# Patient Record
Sex: MALE | Race: WHITE | Employment: UNEMPLOYED | URBAN - METROPOLITAN AREA
[De-identification: names, ages, dates, MRNs, and addresses within clinical notes are randomized per-mention and may not be internally consistent; named-entity substitution may affect disease eponyms.]

---

## 2019-05-23 ENCOUNTER — APPOINTMENT (OUTPATIENT)
Dept: GENERAL RADIOLOGY | Age: 29
End: 2019-05-23
Payer: MEDICARE

## 2019-05-23 ENCOUNTER — HOSPITAL ENCOUNTER (EMERGENCY)
Age: 29
Discharge: OTHER INSTITUTION | End: 2019-05-23
Attending: EMERGENCY MEDICINE
Payer: MEDICARE

## 2019-05-23 ENCOUNTER — APPOINTMENT (OUTPATIENT)
Dept: CT IMAGING | Age: 29
End: 2019-05-23
Payer: MEDICARE

## 2019-05-23 VITALS
TEMPERATURE: 100.6 F | WEIGHT: 147.71 LBS | RESPIRATION RATE: 18 BRPM | OXYGEN SATURATION: 97 % | DIASTOLIC BLOOD PRESSURE: 79 MMHG | SYSTOLIC BLOOD PRESSURE: 136 MMHG | HEART RATE: 106 BPM | BODY MASS INDEX: 20.03 KG/M2

## 2019-05-23 DIAGNOSIS — L03.211 FACIAL CELLULITIS: Primary | ICD-10-CM

## 2019-05-23 LAB
A/G RATIO: 1.2 (ref 1.1–2.2)
ALBUMIN SERPL-MCNC: 4.4 G/DL (ref 3.4–5)
ALP BLD-CCNC: 68 U/L (ref 40–129)
ALT SERPL-CCNC: 62 U/L (ref 10–40)
AMPHETAMINE SCREEN, URINE: POSITIVE
ANION GAP SERPL CALCULATED.3IONS-SCNC: 12 MMOL/L (ref 3–16)
AST SERPL-CCNC: 42 U/L (ref 15–37)
BARBITURATE SCREEN URINE: ABNORMAL
BASOPHILS ABSOLUTE: 0 K/UL (ref 0–0.2)
BASOPHILS RELATIVE PERCENT: 0.2 %
BENZODIAZEPINE SCREEN, URINE: ABNORMAL
BILIRUB SERPL-MCNC: 0.7 MG/DL (ref 0–1)
BUN BLDV-MCNC: 17 MG/DL (ref 7–20)
CALCIUM SERPL-MCNC: 9.9 MG/DL (ref 8.3–10.6)
CANNABINOID SCREEN URINE: ABNORMAL
CHLORIDE BLD-SCNC: 94 MMOL/L (ref 99–110)
CO2: 26 MMOL/L (ref 21–32)
COCAINE METABOLITE SCREEN URINE: ABNORMAL
CREAT SERPL-MCNC: 0.7 MG/DL (ref 0.9–1.3)
EKG ATRIAL RATE: 109 BPM
EKG DIAGNOSIS: NORMAL
EKG P AXIS: 69 DEGREES
EKG P-R INTERVAL: 128 MS
EKG Q-T INTERVAL: 344 MS
EKG QRS DURATION: 94 MS
EKG QTC CALCULATION (BAZETT): 463 MS
EKG R AXIS: 60 DEGREES
EKG T AXIS: 49 DEGREES
EKG VENTRICULAR RATE: 109 BPM
EOSINOPHILS ABSOLUTE: 0 K/UL (ref 0–0.6)
EOSINOPHILS RELATIVE PERCENT: 0 %
GFR AFRICAN AMERICAN: >60
GFR NON-AFRICAN AMERICAN: >60
GLOBULIN: 3.6 G/DL
GLUCOSE BLD-MCNC: 112 MG/DL (ref 70–99)
HCT VFR BLD CALC: 36.6 % (ref 40.5–52.5)
HEMOGLOBIN: 12.2 G/DL (ref 13.5–17.5)
LACTIC ACID, SEPSIS: 1 MMOL/L (ref 0.4–1.9)
LACTIC ACID: 1 MMOL/L (ref 0.4–2)
LYMPHOCYTES ABSOLUTE: 1.7 K/UL (ref 1–5.1)
LYMPHOCYTES RELATIVE PERCENT: 8.1 %
Lab: ABNORMAL
MCH RBC QN AUTO: 30.8 PG (ref 26–34)
MCHC RBC AUTO-ENTMCNC: 33.5 G/DL (ref 31–36)
MCV RBC AUTO: 92.1 FL (ref 80–100)
METHADONE SCREEN, URINE: ABNORMAL
MONOCYTES ABSOLUTE: 1.7 K/UL (ref 0–1.3)
MONOCYTES RELATIVE PERCENT: 8.1 %
NEUTROPHILS ABSOLUTE: 17.3 K/UL (ref 1.7–7.7)
NEUTROPHILS RELATIVE PERCENT: 83.6 %
OPIATE SCREEN URINE: POSITIVE
OXYCODONE URINE: ABNORMAL
PDW BLD-RTO: 13.4 % (ref 12.4–15.4)
PH UA: 6.5
PHENCYCLIDINE SCREEN URINE: ABNORMAL
PLATELET # BLD: 332 K/UL (ref 135–450)
PMV BLD AUTO: 6.8 FL (ref 5–10.5)
POTASSIUM REFLEX MAGNESIUM: 4.3 MMOL/L (ref 3.5–5.1)
POTASSIUM SERPL-SCNC: 4.3 MMOL/L (ref 3.5–5.1)
PROPOXYPHENE SCREEN: ABNORMAL
RBC # BLD: 3.97 M/UL (ref 4.2–5.9)
SODIUM BLD-SCNC: 132 MMOL/L (ref 136–145)
TOTAL PROTEIN: 8 G/DL (ref 6.4–8.2)
WBC # BLD: 20.7 K/UL (ref 4–11)

## 2019-05-23 PROCEDURE — 96375 TX/PRO/DX INJ NEW DRUG ADDON: CPT

## 2019-05-23 PROCEDURE — 96367 TX/PROPH/DG ADDL SEQ IV INF: CPT

## 2019-05-23 PROCEDURE — 93010 ELECTROCARDIOGRAM REPORT: CPT | Performed by: INTERNAL MEDICINE

## 2019-05-23 PROCEDURE — 2500000003 HC RX 250 WO HCPCS: Performed by: EMERGENCY MEDICINE

## 2019-05-23 PROCEDURE — 80307 DRUG TEST PRSMV CHEM ANLYZR: CPT

## 2019-05-23 PROCEDURE — 2580000003 HC RX 258: Performed by: EMERGENCY MEDICINE

## 2019-05-23 PROCEDURE — 71046 X-RAY EXAM CHEST 2 VIEWS: CPT

## 2019-05-23 PROCEDURE — 85025 COMPLETE CBC W/AUTO DIFF WBC: CPT

## 2019-05-23 PROCEDURE — 93005 ELECTROCARDIOGRAM TRACING: CPT | Performed by: EMERGENCY MEDICINE

## 2019-05-23 PROCEDURE — 87040 BLOOD CULTURE FOR BACTERIA: CPT

## 2019-05-23 PROCEDURE — 6360000002 HC RX W HCPCS: Performed by: EMERGENCY MEDICINE

## 2019-05-23 PROCEDURE — 4500000024 HC ED LEVEL 4 PROCEDURE

## 2019-05-23 PROCEDURE — 80053 COMPREHEN METABOLIC PANEL: CPT

## 2019-05-23 PROCEDURE — 70487 CT MAXILLOFACIAL W/DYE: CPT

## 2019-05-23 PROCEDURE — 96365 THER/PROPH/DIAG IV INF INIT: CPT

## 2019-05-23 PROCEDURE — 36415 COLL VENOUS BLD VENIPUNCTURE: CPT

## 2019-05-23 PROCEDURE — 6360000004 HC RX CONTRAST MEDICATION: Performed by: EMERGENCY MEDICINE

## 2019-05-23 PROCEDURE — 99284 EMERGENCY DEPT VISIT MOD MDM: CPT

## 2019-05-23 PROCEDURE — 83605 ASSAY OF LACTIC ACID: CPT

## 2019-05-23 RX ORDER — LIDOCAINE HYDROCHLORIDE AND EPINEPHRINE 10; 10 MG/ML; UG/ML
20 INJECTION, SOLUTION INFILTRATION; PERINEURAL ONCE
Status: COMPLETED | OUTPATIENT
Start: 2019-05-23 | End: 2019-05-23

## 2019-05-23 RX ORDER — MORPHINE SULFATE 2 MG/ML
4 INJECTION, SOLUTION INTRAMUSCULAR; INTRAVENOUS ONCE
Status: COMPLETED | OUTPATIENT
Start: 2019-05-23 | End: 2019-05-23

## 2019-05-23 RX ORDER — CLINDAMYCIN PHOSPHATE 900 MG/50ML
900 INJECTION INTRAVENOUS ONCE
Status: DISCONTINUED | OUTPATIENT
Start: 2019-05-23 | End: 2019-05-23

## 2019-05-23 RX ORDER — CLINDAMYCIN PHOSPHATE 900 MG/50ML
900 INJECTION INTRAVENOUS ONCE
Status: COMPLETED | OUTPATIENT
Start: 2019-05-23 | End: 2019-05-23

## 2019-05-23 RX ORDER — LIDOCAINE HYDROCHLORIDE 10 MG/ML
INJECTION, SOLUTION INFILTRATION; PERINEURAL
Status: DISCONTINUED
Start: 2019-05-23 | End: 2019-05-23 | Stop reason: WASHOUT

## 2019-05-23 RX ORDER — CLINDAMYCIN HYDROCHLORIDE 300 MG/1
300 CAPSULE ORAL 4 TIMES DAILY
Qty: 40 CAPSULE | Refills: 0 | Status: SHIPPED | OUTPATIENT
Start: 2019-05-23 | End: 2019-05-30 | Stop reason: ALTCHOICE

## 2019-05-23 RX ORDER — SODIUM CHLORIDE 9 MG/ML
30 INJECTION, SOLUTION INTRAVENOUS ONCE
Status: COMPLETED | OUTPATIENT
Start: 2019-05-23 | End: 2019-05-23

## 2019-05-23 RX ADMIN — MORPHINE SULFATE 4 MG: 2 INJECTION, SOLUTION INTRAMUSCULAR; INTRAVENOUS at 17:52

## 2019-05-23 RX ADMIN — CLINDAMYCIN PHOSPHATE 900 MG: 900 INJECTION, SOLUTION INTRAVENOUS at 13:43

## 2019-05-23 RX ADMIN — IOPAMIDOL 75 ML: 755 INJECTION, SOLUTION INTRAVENOUS at 14:12

## 2019-05-23 RX ADMIN — PIPERACILLIN SODIUM,TAZOBACTAM SODIUM 3.38 G: 3; .375 INJECTION, POWDER, FOR SOLUTION INTRAVENOUS at 18:09

## 2019-05-23 RX ADMIN — SODIUM CHLORIDE 2010 ML: 9 INJECTION, SOLUTION INTRAVENOUS at 13:43

## 2019-05-23 RX ADMIN — LIDOCAINE HYDROCHLORIDE AND EPINEPHRINE 20 ML: 10; 10 INJECTION, SOLUTION INFILTRATION; PERINEURAL at 16:16

## 2019-05-23 ASSESSMENT — PAIN DESCRIPTION - ONSET
ONSET: GRADUAL

## 2019-05-23 ASSESSMENT — PAIN DESCRIPTION - FREQUENCY
FREQUENCY: CONTINUOUS

## 2019-05-23 ASSESSMENT — PAIN DESCRIPTION - DESCRIPTORS
DESCRIPTORS: THROBBING

## 2019-05-23 ASSESSMENT — PAIN DESCRIPTION - PAIN TYPE
TYPE: ACUTE PAIN

## 2019-05-23 ASSESSMENT — ENCOUNTER SYMPTOMS
EYE DISCHARGE: 0
FACIAL SWELLING: 1
EYE REDNESS: 0
SHORTNESS OF BREATH: 0
VOMITING: 0
ABDOMINAL PAIN: 0
CHOKING: 0
APNEA: 0
BACK PAIN: 0
NAUSEA: 0

## 2019-05-23 ASSESSMENT — PAIN SCALES - GENERAL
PAINLEVEL_OUTOF10: 8
PAINLEVEL_OUTOF10: 9
PAINLEVEL_OUTOF10: 8
PAINLEVEL_OUTOF10: 9
PAINLEVEL_OUTOF10: 9

## 2019-05-23 ASSESSMENT — PAIN DESCRIPTION - LOCATION
LOCATION: FACE;MOUTH

## 2019-05-23 ASSESSMENT — PAIN DESCRIPTION - PROGRESSION
CLINICAL_PROGRESSION: GRADUALLY WORSENING
CLINICAL_PROGRESSION: GRADUALLY WORSENING
CLINICAL_PROGRESSION: GRADUALLY IMPROVING

## 2019-05-23 ASSESSMENT — PAIN DESCRIPTION - ORIENTATION
ORIENTATION: LEFT

## 2019-05-23 ASSESSMENT — PAIN - FUNCTIONAL ASSESSMENT: PAIN_FUNCTIONAL_ASSESSMENT: PREVENTS OR INTERFERES SOME ACTIVE ACTIVITIES AND ADLS

## 2019-05-23 NOTE — ED NOTES
Pt will be transferred to [facility] via family member (grandmother) at around 200 per pt. MD jeffrey is aware of plans and agrees with transport plans.      Calls have been made by MD jeffrey to [facility] ED physician      Sae Holland RN  05/23/19 9788

## 2019-05-23 NOTE — ED NOTES
Grandmother arrived to ED and was ready and willing to transport pt to [facility] per pt request. Pt was given instructions to present to [facility] ED for further treatment and follow up. A report was called to [facility] MD to report pt condition by MD Mel Acevedo. Pt Signed a refusal to allow [facility] to set up transport services. Pt verbalized understanding of instruction.       Clyde Murrieta RN  05/23/19 1922

## 2019-05-23 NOTE — ED PROVIDER NOTES
[address] [place] [address]  eMERGENCY dEPARTMENT eNCOUnter   Physician Attestation    Pt Name: Jitendra Zamora  MRN: [id number]  Corriegfnaldo 1990  Date of evaluation: 5/23/19        Physician Note:    I havepersonally performed and/or participated in the history, exam and medical decision making and agree with all pertinent clinical information. I have also reviewed and agree with the past medical, family and social historyunless otherwise noted. I have personally performed a face to face diagnostic evaluation onthis patient. I have reviewed the mid-levels findings and agree. History:   Location/Symptom: Patient comes in complaining of facial swelling  Timing/Onset: Gradually coming on over the past 3 days  Context/Setting: Patient does have a history of IV drug use. States it started as a pimple on his face and then gradually got worse. Quality: Throbbing  Duration: Constant  Modifying Factors: Touching the area  Severity: 8 out of 10      Physical Exam   Constitutional: He is oriented to person, place, and time. He appears well-developed and well-nourished. He appears ill. HENT:   Head: Normocephalic and atraumatic. Right Ear: External ear normal.   Left Ear: External ear normal.   Patient has left-sided facial swelling. He has some indurated angioedematous type swelling of his left upper and lower lip. It is a firm indurated mass just lateral and above the left upper lip. Airway is patent. Eyes: Conjunctivae are normal. Right eye exhibits no discharge. Left eye exhibits no discharge. No scleral icterus. Neck: Normal range of motion. No tracheal deviation present. Cardiovascular: Regular rhythm and normal heart sounds. Tachycardia present. Pulmonary/Chest: Effort normal. No stridor. No respiratory distress. Musculoskeletal: Normal range of motion. Neurological: He is alert and oriented to person, place, and time. Coordination normal.   Skin: Skin is warm and dry.  He is not is without acute process. The osseous structures are without acute process. No acute process. Ct Facial Bones W Contrast    Result Date: 5/23/2019  EXAMINATION: CT OF THE FACE WITH CONTRAST 5/23/2019 TECHNIQUE: CT of the face was performed with the administration of intravenous contrast. Multiplanar reformatted images are provided for review. Dose modulation, iterative reconstruction, and/or weight based adjustment of the mA/kV was utilized to reduce the radiation dose to as low as reasonably achievable. COMPARISON: None. HISTORY: ORDERING SYSTEM PROVIDED HISTORY: IVDU, Fever, Tachycardia, left facial swelling, looking for an abscess TECHNOLOGIST PROVIDED HISTORY: Ordering Physician Provided Reason for Exam: IVDU, Fever, Tachycardia, left facial swelling, looking for an abscess Acuity: Acute Type of Exam: Initial FINDINGS: PHARYNX/LARYNX: Mild left-sided parapharyngeal soft tissue swelling appears present but no abscess. Narrowing is widely patent as is the trachea. SALIVARY GLANDS: The parotid and submandibular glands appear unremarkable. LYMPH NODES: No cervical lymphadenopathy is seen. SOFT TISSUES: Left-sided soft tissue swelling is noted. , primarily in the superficial tissues. BRAIN/ORBITS/SINUSES: The visualized portion of the intracranial contents appear unremarkable. The visualized portion of the orbits, paranasal sinuses and mastoid air cells demonstrate no acute abnormality. BONES:  No acute osseous abnormality is seen. Left-sided soft tissue swelling mostly involving the superficial tissues, with mild left-sided parapharyngeal soft tissue swelling also noted. No abscess. 1. Facial cellulitis          DISPOSITION/PLAN  current plan is to transfer to [facility].       DISCHARGE MEDICATIONS:  New Prescriptions    CLINDAMYCIN (CLEOCIN) 300 MG CAPSULE    Take 1 capsule by mouth 4 times daily for 10 days May sub Generic and 150 mg capsules and 2x the amount         Min Settle Roderick Elise MD             For further details please see the other provider's documentation.     Sandy Gonzalez MD  Attending Emergency Physician         Sandy Gonzalez MD  05/23/19 3819

## 2019-05-23 NOTE — ED PROVIDER NOTES
Genitourinary: Negative for dysuria. Musculoskeletal: Negative for back pain, neck pain and neck stiffness. Neurological: Negative for dizziness, tremors, seizures and headaches. All other systems reviewed and are negative. Positives and Pertinent negatives as per HPI. Except as noted abovein the ROS, all other systems were reviewed and negative. PAST MEDICAL HISTORY   History reviewed. No pertinent past medical history. SURGICAL HISTORY     Past Surgical History:   Procedure Laterality Date    FRACTURE SURGERY      Right wrist, L collar bone         CURRENTMEDICATIONS       Previous Medications    No medications on file         ALLERGIES     Patient has no known allergies. FAMILYHISTORY       Family History   Problem Relation Age of Onset    Asthma Mother     Substance Abuse Mother     Learning Disabilities Sister     High Cholesterol Paternal Grandmother           SOCIAL HISTORY       Social History     Socioeconomic History    Marital status: Single     Spouse name: None    Number of children: None    Years of education: None    Highest education level: None   Occupational History    None   Social Needs    Financial resource strain: None    Food insecurity:     Worry: None     Inability: None    Transportation needs:     Medical: None     Non-medical: None   Tobacco Use    Smoking status: Current Every Day Smoker     Packs/day: 1.00     Types: Cigarettes    Smokeless tobacco: Current User     Types: Chew   Substance and Sexual Activity    Alcohol use:  Yes     Alcohol/week: 7.2 oz     Types: 12 Cans of beer per week     Comment: occasional use    Drug use: Yes     Types: IV, Opiates      Comment: 0.5 gram a day     Sexual activity: None   Lifestyle    Physical activity:     Days per week: None     Minutes per session: None    Stress: None   Relationships    Social connections:     Talks on phone: None     Gets together: None     Attends Shinto service: None Active member of club or organization: None     Attends meetings of clubs or organizations: None     Relationship status: None    Intimate partner violence:     Fear of current or ex partner: None     Emotionally abused: None     Physically abused: None     Forced sexual activity: None   Other Topics Concern    None   Social History Narrative    None       SCREENINGS             PHYSICAL EXAM    (up to 7 for level 4, 8 or more for level 5)     ED Triage Vitals [05/23/19 1219]   BP Temp Temp Source Pulse Resp SpO2 Height Weight   (!) 141/81 100.6 °F (38.1 °C) Oral 121 18 98 % -- 147 lb 11.3 oz (67 kg)       Physical Exam   Constitutional: He is oriented to person, place, and time. He appears well-developed and well-nourished. HENT:   Head: Normocephalic and atraumatic. Nose: Nose normal.   Eyes: Right eye exhibits no discharge. Left eye exhibits no discharge. Neck: Normal range of motion. Neck supple. Cardiovascular: Normal rate, regular rhythm and normal heart sounds. Exam reveals no gallop and no friction rub. No murmur heard. Pulmonary/Chest: Effort normal and breath sounds normal. No respiratory distress. He has no wheezes. He has no rales. He exhibits no tenderness. Musculoskeletal: Normal range of motion. Neurological: He is alert and oriented to person, place, and time. Skin: Skin is warm and dry. He is not diaphoretic. Psychiatric: He has a normal mood and affect. His behavior is normal.   Nursing note and vitals reviewed.       DIAGNOSTIC RESULTS   LABS:    Labs Reviewed   CBC WITH AUTO DIFFERENTIAL - Abnormal; Notable for the following components:       Result Value    WBC 20.7 (*)     RBC 3.97 (*)     Hemoglobin 12.2 (*)     Hematocrit 36.6 (*)     Neutrophils # 17.3 (*)     Monocytes # 1.7 (*)     All other components within normal limits    Narrative:     Performed at:  [place] [address]   Phone [phone] COMPREHENSIVE METABOLIC PANEL - Abnormal; Notable for the following components:    Sodium 132 (*)     Chloride 94 (*)     Glucose 112 (*)     CREATININE 0.7 (*)     ALT 62 (*)     AST 42 (*)     All other components within normal limits    Narrative:     Performed at:  [address] "[employer] [address]   Phone [phone]   URINE DRUG SCREEN - Abnormal; Notable for the following components:    Amphetamine Screen, Urine POSITIVE (*)     Opiate Scrn, Ur POSITIVE (*)     All other components within normal limits    Narrative:     Performed at:  [address] [address]   Phone [phone]   CULTURE BLOOD #1   CULTURE BLOOD #2   LACTIC ACID, PLASMA    Narrative:     Performed at:  [address] "[employer] [address]   Phone [phone]   COMPREHENSIVE METABOLIC PANEL W/ REFLEX TO MG FOR LOW K    Narrative:     [address]formed [place]:  [address] [address]   Phone [phone]   LACTATE, SEPSIS    Narrative:     Performed at:  [address] [address]   Phone [phone]   LACTATE, SEPSIS       All other labs were within normal range or not returned as of this dictation. EKG: All EKG's are interpreted by the Emergency Department Physician who either signs orCo-signs this chart in the absence of a cardiologist.  Please see their note for interpretation of EKG.       RADIOLOGY:   Non-plain film images such as CT, Ultrasound and MRI are read by the radiologist. Plain radiographic images are visualized andpreliminarily interpreted by the  ED Provider with the below findings:        Interpretation [facility] Radiologist below, if available at the time of this note:    [place] [address]   Final Result   Left-sided soft tissue swelling mostly involving the superficial tissues,   with mild left-sided parapharyngeal soft tissue swelling also noted. No   abscess. XR CHEST STANDARD (2 VW)   Final Result   No acute process. Xr Chest Standard (2 Vw)    Result Date: 5/23/2019  EXAMINATION: TWO XRAY VIEWS OF THE CHEST 5/23/2019 1:16 pm COMPARISON: 10/07/2017 HISTORY: ORDERING SYSTEM PROVIDED HISTORY: IVDU, Fever, Tachycardia, Facial abscess as well TECHNOLOGIST PROVIDED HISTORY: Reason for exam:->IVDU, Fever, Tachycardia, Facial abscess as well Ordering Physician Provided Reason for Exam: IVDU, Fever, Tachycardia, Facial abscess as well Acuity: Acute Type of Exam: Initial FINDINGS: The lungs are without acute focal process. There is no effusion or pneumothorax. The cardiomediastinal silhouette is without acute process. The osseous structures are without acute process. No acute process. Ct Facial Bones W Contrast    Result Date: 5/23/2019  EXAMINATION: CT OF THE FACE WITH CONTRAST 5/23/2019 TECHNIQUE: CT of the face was performed with the administration of intravenous contrast. Multiplanar reformatted images are provided for review. Dose modulation, iterative reconstruction, and/or weight based adjustment of the mA/kV was utilized to reduce the radiation dose to as low as reasonably achievable. COMPARISON: None. HISTORY: ORDERING SYSTEM PROVIDED HISTORY: IVDU, Fever, Tachycardia, left facial swelling, looking for an abscess TECHNOLOGIST PROVIDED HISTORY: Ordering Physician Provided Reason for Exam: IVDU, Fever, Tachycardia, left facial swelling, looking for an abscess Acuity: Acute Type of Exam: Initial FINDINGS: PHARYNX/LARYNX: Mild left-sided parapharyngeal soft tissue swelling appears present but no abscess. Narrowing is widely patent as is the trachea. SALIVARY GLANDS: The parotid and submandibular glands appear unremarkable. LYMPH NODES: No cervical lymphadenopathy is seen.  SOFT TISSUES: Left-sided soft Stopped 5/23/19 1446)   iopamidol (ISOVUE-370) 76 % injection 75 mL (75 mLs Intravenous Given 5/23/19 1412)   lidocaine-EPINEPHrine 1 percent-1:813261 injection 20 mL (20 mLs Intradermal Given by Other 5/23/19 1616)   morphine (PF) injection 4 mg (4 mg Intravenous Given 5/23/19 1752)   piperacillin-tazobactam (ZOSYN) 3.375 g in dextrose 5 % 50 mL IVPB (mini-bag) (0 g Intravenous Stopped 5/23/19 1839)       Emergency room course: Patient on exam patient facial exam shows mild swelling on the left side face particularly around the upper and lower lip on the left side slightly deviated over to the right side upper lip. There is no area of fluctuance. Appears to be more soft tissue swelling. Just above the left side upper lip there is a small area of fluctuance where there appears to be a small area where this might of been draining. Cardiovascular regular rhythm, lungs clear no wheezes rales or rhonchi. Abdomen soft nontender. Neurologically patient is no motor sensory deficit noted. I did palpate inside his mouth he does have a slight mucosal swelling on the left side. But no area of fluctuance. Gingiva shows no swelling. There is no active drainage inside the mouth. Patient was prepped for a stab incision. He was given an infraorbital block by Dr. Mary Lou Ceron. I prepped the skin and did a stab incision. There was no active drainage. I did try to explain to this patient that it all appears to be soft tissue swelling. Lab results from today CBC has a white count of 20.7, RBC 3.97, hemoglobin 12.2 and hematocrit 36.6. CMP shows sodium 132, potassium 4.3, chloride 94 with a BUN of 17 creatinine 0.7. Elevated ALT at 62 and AST at 42. Lactic acid 1.0. Urine drug screen shows positive for amphetamines and positive for opiates. CT of facial bones shows left side is soft tissue swelling mostly involving the superficial tissue with mild left sided Pharyngeal soft tissue swelling also noted.  No abscess. Tried to explain to this patient that swelling is mostly soft tissue swelling at this time if he did get pus out of that he squeezed it all out CT shows negative for abscess. He swears there is pus in there want us to cut several areas of his face. Tried to explain to him again that no pus or abscess is in any of those areas I did have him squeeze where he said it was at and the still there was no drainage. He is solely convinced that there is pus that needs to be drained. At this time due the patient white count was facial swelling thought be best that he be admitted. Patient refused. So I will have him sign out [address] and give him clindamycin. After several bouts of convincing this patient that he needs to be seen and admitted the admitted. Dr. Aston Marks made arrangements for him to be transferred to [facility]. See his ED notes for remaining of ER course and final disposition. FINAL IMPRESSION      1. Facial cellulitis          DISPOSITION/PLAN   DISPOSITION Decision To Transfer 05/23/2019 05:43:25 PM      PATIENT REFERREDTO:  Graciela Alcocer MD  [address] [place] [address]. [facility] [address]  [phone]    In 1 day        DISCHARGE MEDICATIONS:  New Prescriptions    CLINDAMYCIN (CLEOCIN) 300 MG CAPSULE    Take 1 capsule by mouth 4 times daily for 10 days May sub Generic and 150 mg capsules and 2x the amount       DISCONTINUED MEDICATIONS:  Discontinued Medications    CEPHALEXIN 500 MG TABS    Take 500 mg by mouth 3 times daily.     DOCUSATE SODIUM (COLACE) 100 MG CAPSULE    Take 1 capsule by mouth 2 times daily    FAMOTIDINE (PEPCID) 20 MG TABLET    Take 1 tablet by mouth 2 times daily              (Please note that portions ofthis note were completed with a voice recognition program.  Efforts were made to edit the dictations but occasionally words are mis-transcribed.)    Patrice Estes PA-C (electronically signed)           Patrice Estes PA-C  05/23/19 1907

## 2019-05-23 NOTE — ED TRIAGE NOTES
Pt c/o left sided facial swelling pt states that he had a pimple that he tried to pop and the swelling started. Pt states that his face has been swollen for 2 days. Pt is currently alert and oriented x 4. Pt is answering questions approprietly, in full sentences without difficulty or trouble breathing. Pt is currently resting in bed in supine position. Pt VS are as noted.

## 2019-05-23 NOTE — ED NOTES
Pt alert and oriented to person, place, time and event. Pt answering questions appropriately, in full sentences without difficulty. Pt skin color is appropriate for patient and is warm and dry. Pt is able to ambulate without difficulty to ED exit. All questions and concerns were addressed and pt verbalized understanding. Pt was educated to follow instructions on DC paperwork or to return to ED if any new concerns arise. Pt currently has no new complaints and all treatments, provider orders for this visit are completed.       Germain Leyden, RN  05/23/19 5061

## 2019-05-28 LAB
BLOOD CULTURE, ROUTINE: NORMAL
CULTURE, BLOOD 2: NORMAL

## 2019-05-30 ENCOUNTER — HOSPITAL ENCOUNTER (INPATIENT)
Age: 29
LOS: 5 days | Discharge: HOME | DRG: 383 | End: 2019-06-04
Attending: INTERNAL MEDICINE | Admitting: FAMILY MEDICINE
Payer: MEDICARE

## 2019-05-30 DIAGNOSIS — B95.62 MRSA BACTEREMIA: ICD-10-CM

## 2019-05-30 DIAGNOSIS — F19.90 IVDU (INTRAVENOUS DRUG USER): ICD-10-CM

## 2019-05-30 DIAGNOSIS — L03.211 FACIAL CELLULITIS: Primary | ICD-10-CM

## 2019-05-30 DIAGNOSIS — R78.81 MRSA BACTEREMIA: ICD-10-CM

## 2019-05-30 DIAGNOSIS — Z87.898 HISTORY OF BACTEREMIA: ICD-10-CM

## 2019-05-30 LAB
A/G RATIO: 1.2 (ref 1.1–2.2)
ALBUMIN SERPL-MCNC: 4.2 G/DL (ref 3.4–5)
ALP BLD-CCNC: 68 U/L (ref 40–129)
ALT SERPL-CCNC: 139 U/L (ref 10–40)
ANION GAP SERPL CALCULATED.3IONS-SCNC: 12 MMOL/L (ref 3–16)
AST SERPL-CCNC: 188 U/L (ref 15–37)
BASOPHILS ABSOLUTE: 0.1 K/UL (ref 0–0.2)
BASOPHILS RELATIVE PERCENT: 0.4 %
BILIRUB SERPL-MCNC: <0.2 MG/DL (ref 0–1)
BUN BLDV-MCNC: 13 MG/DL (ref 7–20)
CALCIUM SERPL-MCNC: 9.6 MG/DL (ref 8.3–10.6)
CHLORIDE BLD-SCNC: 98 MMOL/L (ref 99–110)
CO2: 32 MMOL/L (ref 21–32)
CREAT SERPL-MCNC: 0.7 MG/DL (ref 0.9–1.3)
EOSINOPHILS ABSOLUTE: 0.2 K/UL (ref 0–0.6)
EOSINOPHILS RELATIVE PERCENT: 1.2 %
GFR AFRICAN AMERICAN: >60
GFR NON-AFRICAN AMERICAN: >60
GLOBULIN: 3.5 G/DL
GLUCOSE BLD-MCNC: 95 MG/DL (ref 70–99)
HAV IGM SER IA-ACNC: ABNORMAL
HCT VFR BLD CALC: 35.5 % (ref 40.5–52.5)
HEMOGLOBIN: 11.6 G/DL (ref 13.5–17.5)
HEPATITIS B CORE IGM ANTIBODY: ABNORMAL
HEPATITIS B SURFACE ANTIGEN INTERPRETATION: ABNORMAL
HEPATITIS C ANTIBODY INTERPRETATION: REACTIVE
LACTIC ACID: 1.1 MMOL/L (ref 0.4–2)
LYMPHOCYTES ABSOLUTE: 2.3 K/UL (ref 1–5.1)
LYMPHOCYTES RELATIVE PERCENT: 17.2 %
MCH RBC QN AUTO: 29.8 PG (ref 26–34)
MCHC RBC AUTO-ENTMCNC: 32.8 G/DL (ref 31–36)
MCV RBC AUTO: 91 FL (ref 80–100)
MONOCYTES ABSOLUTE: 1.2 K/UL (ref 0–1.3)
MONOCYTES RELATIVE PERCENT: 9.1 %
NEUTROPHILS ABSOLUTE: 9.5 K/UL (ref 1.7–7.7)
NEUTROPHILS RELATIVE PERCENT: 72.1 %
PDW BLD-RTO: 13.5 % (ref 12.4–15.4)
PLATELET # BLD: 444 K/UL (ref 135–450)
PMV BLD AUTO: 6.1 FL (ref 5–10.5)
POTASSIUM REFLEX MAGNESIUM: 3.7 MMOL/L (ref 3.5–5.1)
PROCALCITONIN: 0.13 NG/ML (ref 0–0.15)
RBC # BLD: 3.9 M/UL (ref 4.2–5.9)
SODIUM BLD-SCNC: 142 MMOL/L (ref 136–145)
TOTAL PROTEIN: 7.7 G/DL (ref 6.4–8.2)
WBC # BLD: 13.2 K/UL (ref 4–11)

## 2019-05-30 PROCEDURE — 99254 IP/OBS CNSLTJ NEW/EST MOD 60: CPT | Performed by: INTERNAL MEDICINE

## 2019-05-30 PROCEDURE — 80053 COMPREHEN METABOLIC PANEL: CPT

## 2019-05-30 PROCEDURE — 96365 THER/PROPH/DIAG IV INF INIT: CPT

## 2019-05-30 PROCEDURE — 6370000000 HC RX 637 (ALT 250 FOR IP): Performed by: FAMILY MEDICINE

## 2019-05-30 PROCEDURE — 87205 SMEAR GRAM STAIN: CPT

## 2019-05-30 PROCEDURE — 36415 COLL VENOUS BLD VENIPUNCTURE: CPT

## 2019-05-30 PROCEDURE — 2500000003 HC RX 250 WO HCPCS: Performed by: PHYSICIAN ASSISTANT

## 2019-05-30 PROCEDURE — 2580000003 HC RX 258: Performed by: INTERNAL MEDICINE

## 2019-05-30 PROCEDURE — 1200000000 HC SEMI PRIVATE

## 2019-05-30 PROCEDURE — 2500000003 HC RX 250 WO HCPCS: Performed by: INTERNAL MEDICINE

## 2019-05-30 PROCEDURE — 87040 BLOOD CULTURE FOR BACTERIA: CPT

## 2019-05-30 PROCEDURE — 80074 ACUTE HEPATITIS PANEL: CPT

## 2019-05-30 PROCEDURE — 83605 ASSAY OF LACTIC ACID: CPT

## 2019-05-30 PROCEDURE — 84145 PROCALCITONIN (PCT): CPT

## 2019-05-30 PROCEDURE — 99284 EMERGENCY DEPT VISIT MOD MDM: CPT

## 2019-05-30 PROCEDURE — 85025 COMPLETE CBC W/AUTO DIFF WBC: CPT

## 2019-05-30 PROCEDURE — 6360000002 HC RX W HCPCS: Performed by: INTERNAL MEDICINE

## 2019-05-30 PROCEDURE — 87070 CULTURE OTHR SPECIMN AEROBIC: CPT

## 2019-05-30 PROCEDURE — 87522 HEPATITIS C REVRS TRNSCRPJ: CPT

## 2019-05-30 PROCEDURE — 6360000002 HC RX W HCPCS: Performed by: FAMILY MEDICINE

## 2019-05-30 RX ORDER — SODIUM CHLORIDE 0.9 % (FLUSH) 0.9 %
10 SYRINGE (ML) INJECTION EVERY 12 HOURS SCHEDULED
Status: DISCONTINUED | OUTPATIENT
Start: 2019-05-30 | End: 2019-06-04 | Stop reason: HOSPADM

## 2019-05-30 RX ORDER — CLONIDINE HYDROCHLORIDE 0.1 MG/1
0.1 TABLET ORAL PRN
Status: DISCONTINUED | OUTPATIENT
Start: 2019-05-30 | End: 2019-06-04 | Stop reason: HOSPADM

## 2019-05-30 RX ORDER — ACETAMINOPHEN 500 MG
1000 TABLET ORAL EVERY 8 HOURS PRN
Status: DISCONTINUED | OUTPATIENT
Start: 2019-05-30 | End: 2019-06-04 | Stop reason: HOSPADM

## 2019-05-30 RX ORDER — ONDANSETRON 2 MG/ML
4 INJECTION INTRAMUSCULAR; INTRAVENOUS EVERY 6 HOURS PRN
Status: DISCONTINUED | OUTPATIENT
Start: 2019-05-30 | End: 2019-06-04 | Stop reason: HOSPADM

## 2019-05-30 RX ORDER — DICYCLOMINE HCL 20 MG
20 TABLET ORAL EVERY 6 HOURS PRN
Status: DISCONTINUED | OUTPATIENT
Start: 2019-05-30 | End: 2019-06-04 | Stop reason: HOSPADM

## 2019-05-30 RX ORDER — TRAMADOL HYDROCHLORIDE 50 MG/1
50 TABLET ORAL PRN
Status: DISCONTINUED | OUTPATIENT
Start: 2019-05-30 | End: 2019-06-02

## 2019-05-30 RX ORDER — KETOROLAC TROMETHAMINE 30 MG/ML
30 INJECTION, SOLUTION INTRAMUSCULAR; INTRAVENOUS EVERY 6 HOURS PRN
Status: DISPENSED | OUTPATIENT
Start: 2019-05-30 | End: 2019-06-04

## 2019-05-30 RX ORDER — SODIUM CHLORIDE 0.9 % (FLUSH) 0.9 %
10 SYRINGE (ML) INJECTION PRN
Status: DISCONTINUED | OUTPATIENT
Start: 2019-05-30 | End: 2019-06-04 | Stop reason: HOSPADM

## 2019-05-30 RX ORDER — HYDROXYZINE PAMOATE 25 MG/1
50 CAPSULE ORAL EVERY 8 HOURS PRN
Status: DISCONTINUED | OUTPATIENT
Start: 2019-05-30 | End: 2019-06-04 | Stop reason: HOSPADM

## 2019-05-30 RX ADMIN — KETOROLAC TROMETHAMINE 30 MG: 30 INJECTION, SOLUTION INTRAMUSCULAR at 09:09

## 2019-05-30 RX ADMIN — DAPTOMYCIN 465 MG: 500 INJECTION, POWDER, LYOPHILIZED, FOR SOLUTION INTRAVENOUS at 09:55

## 2019-05-30 RX ADMIN — Medication 10 ML: at 08:16

## 2019-05-30 RX ADMIN — METRONIDAZOLE 500 MG: 500 INJECTION, SOLUTION INTRAVENOUS at 02:49

## 2019-05-30 RX ADMIN — METRONIDAZOLE 500 MG: 500 INJECTION, SOLUTION INTRAVENOUS at 08:16

## 2019-05-30 RX ADMIN — CEFEPIME HYDROCHLORIDE 2 G: 2 INJECTION, POWDER, FOR SOLUTION INTRAVENOUS at 04:40

## 2019-05-30 RX ADMIN — ACETAMINOPHEN 1000 MG: 500 TABLET ORAL at 09:09

## 2019-05-30 ASSESSMENT — PAIN SCALES - GENERAL
PAINLEVEL_OUTOF10: 0
PAINLEVEL_OUTOF10: 10
PAINLEVEL_OUTOF10: 10
PAINLEVEL_OUTOF10: 0
PAINLEVEL_OUTOF10: 5
PAINLEVEL_OUTOF10: 10
PAINLEVEL_OUTOF10: 10

## 2019-05-30 ASSESSMENT — PAIN DESCRIPTION - ORIENTATION
ORIENTATION: LEFT

## 2019-05-30 ASSESSMENT — PAIN DESCRIPTION - DESCRIPTORS
DESCRIPTORS: ACHING
DESCRIPTORS: CONSTANT
DESCRIPTORS: CONSTANT
DESCRIPTORS: ACHING
DESCRIPTORS: ACHING;CONSTANT

## 2019-05-30 ASSESSMENT — PAIN DESCRIPTION - PROGRESSION
CLINICAL_PROGRESSION: NOT CHANGED
CLINICAL_PROGRESSION: GRADUALLY WORSENING
CLINICAL_PROGRESSION: NOT CHANGED
CLINICAL_PROGRESSION: GRADUALLY WORSENING
CLINICAL_PROGRESSION: NOT CHANGED

## 2019-05-30 ASSESSMENT — PAIN DESCRIPTION - PAIN TYPE
TYPE: ACUTE PAIN

## 2019-05-30 ASSESSMENT — ENCOUNTER SYMPTOMS
SORE THROAT: 0
ABDOMINAL PAIN: 0
SHORTNESS OF BREATH: 0
FACIAL SWELLING: 1
VOMITING: 0
BACK PAIN: 0
NAUSEA: 0

## 2019-05-30 ASSESSMENT — PAIN DESCRIPTION - LOCATION
LOCATION: FACE

## 2019-05-30 ASSESSMENT — PAIN DESCRIPTION - FREQUENCY
FREQUENCY: CONTINUOUS

## 2019-05-30 ASSESSMENT — PAIN - FUNCTIONAL ASSESSMENT
PAIN_FUNCTIONAL_ASSESSMENT: PREVENTS OR INTERFERES SOME ACTIVE ACTIVITIES AND ADLS

## 2019-05-30 ASSESSMENT — PAIN DESCRIPTION - ONSET
ONSET: ON-GOING
ONSET: ON-GOING
ONSET: PROGRESSIVE
ONSET: ON-GOING
ONSET: ON-GOING

## 2019-05-30 NOTE — ED PROVIDER NOTES
Skin: Negative for rash. Neurological: Negative for light-headedness and headaches. Psychiatric/Behavioral: The patient is not nervous/anxious. All other systems reviewed and are negative. Except as noted above the remainder ofthe review of systems was reviewed and negative. PAST MEDICALHISTORY   History reviewed. No pertinent past medical history. SURGICAL HISTORY           Procedure Laterality Date    FRACTURE SURGERY      Right wrist, L collar bone       CURRENT MEDICATIONS       Previous Medications    No medications on file       ALLERGIES     Patient has no known allergies. FAMILY HISTORY           Problem Relation Age of Onset    Asthma Mother     Substance Abuse Mother     Learning Disabilities Sister     High Cholesterol Paternal Grandmother      Family Status   Relation Name Status    Mother  (Not Specified)   [facility] Sister  (Not Specified)    PGM  (Not Specified)        SOCIAL HISTORY    reports that he has been smoking cigarettes. He has been smoking about 1.00 pack per day. His smokeless tobacco use includes chew. He reports that he drinks about 7.2 oz of alcohol per week. He reports that he has current or past drug history. Drugs: IV and Opiates . PHYSICAL EXAM    (up to 7 for level 4, 8 or more for level 5)     ED Triage Vitals    BP Temp Temp Source Pulse Resp SpO2 Height Weight   (!) 141/83 98.9 °F (37.2 °C) Oral 89 18 99 % 6' (1.829 m) 151 lb 14.4 oz (68.9 kg)       Physical Exam   Constitutional: He is oriented to person, place, and time. He appears well-developed and well-nourished. No distress. HENT:   Head: Normocephalic and atraumatic. Mouth/Throat: Oropharynx is clear and moist.   Induration erythema and tenderness left perioral face with scab to lip   Neck: Neck supple. Cardiovascular: Normal rate, regular rhythm and normal heart sounds. Pulmonary/Chest: Effort normal and breath sounds normal. No respiratory distress.    Musculoskeletal: Normal range of motion. Neurological: He is alert and oriented to person, place, and time. Skin: Skin is warm and dry. Psychiatric: He has a normal mood and affect. His behavior is normal.   Nursing note and vitals reviewed. DIAGNOSTIC RESULTS       LABS:  Labs Reviewed   CBC WITH AUTO DIFFERENTIAL - Abnormal; Notable for the following components:       Result Value    WBC 13.2 (*)     RBC 3.90 (*)     Hemoglobin 11.6 (*)     Hematocrit 35.5 (*)     Neutrophils # 9.5 (*)     All other components within normal limits    Narrative:     Performed at:  [address] [place] [address]   Phone [phone]   COMPREHENSIVE METABOLIC PANEL W/ REFLEX TO MG FOR LOW K - Abnormal; Notable for the following components:    Chloride 98 (*)     CREATININE 0.7 (*)      (*)      (*)     All other components within normal limits    Narrative:     Performed at:  [address] [place] [address]   Phone [phone]   CULTURE BLOOD #1   CULTURE BLOOD #2   LACTIC ACID, PLASMA    Narrative:     Performed at:  [address] [place] [address]   Phone [phone]       All other labs were within normal range or not returned as of this dictation. EMERGENCY DEPARTMENT COURSE and DIFFERENTIAL DIAGNOSIS/MDM:   Vitals:    Vitals:    05/30/19 0030   BP: (!) 141/83   Pulse: 89   Resp: 18   Temp: 98.9 °F (37.2 °C)   TempSrc: Oral   SpO2: 99%   Weight: 151 lb 14.4 oz (68.9 kg)   Height: 6' (1.829 m)        I have evaluated this patient. My supervising physician was available for consultation. He is nontoxic and afebrile. The patient has a history of bacteremia and is supposed to undergo echocardiogram to rule out endocarditis.   His blood work today is reassuring as WBC has improved, has normal lactic, however have reached out to the hospitalist for admission to

## 2019-05-30 NOTE — ED TRIAGE NOTES
Pt to ED with left oral swelling. Pt states that the swelling began about one week ago. Pt has been to three hospitals to get his lip checked out. Pt states he was admitted to [facility] for this. Pt states he had to leave the hospital for family reasons. Pt now returning to a hospital for possible treatment.

## 2019-05-30 NOTE — CONSULTS
Infectious Diseases   Consult Note      Reason for Consult:  MRSA bacteremia and facial abscess  Requesting Physician: Tristen Gayle Md      Date of Admission: 5/30/2019  Subjective:   CHIEF COMPLAINT:  None given       HPI:    Chavo Khan is a 36yoM with history of IVDU. He was seen in the ER 5/23 with L lip/facial swelling/cellulitis  He was prescribed clindamycin     He was seen in ER at [facility] later on 5/23 and sometime following that was admitted to [place] [address] 5/24 for the same. CT was negative for abscess. Also noted to have abscess on his back. He was seen by ID, ENT, GS  He had bedside I&D facial abscess 5/26. He says it is much better. Blood culture on 5/24 was positive for S aureus/MRSA by PCR  He was treated with IV vanc and cefepime while inpatient   He left [facility] 5/29    He came to ER at [facility] 5/29 for further management  WBC was 13.2. LFTs elevated   He is afebrile   No other localizing complaints        Current abx:  Cefepime, flagyl x1 dose 5/30  dapto 6mg/kg/24       Past Surgical History:   History reviewed. No pertinent past medical history. Procedure Laterality Date    FRACTURE SURGERY      Right wrist, L collar bone       Social History:    TOBACCO:   reports that he has been smoking cigarettes. He has been smoking about 1.00 pack per day. His smokeless tobacco use includes chew. ETOH:   reports that he drinks about 7.2 oz of alcohol per week.      Family History:       Problem Relation Age of Onset   Shahrzad Robertson Asthma Mother     Substance Abuse Mother     Learning Disabilities Sister     High Cholesterol Paternal Grandmother      Current Medications:    Current Facility-Administered Medications: sodium chloride flush 0.9 % injection 10 mL, 10 mL, Intravenous, 2 times per day  sodium chloride flush 0.9 % injection 10 mL, 10 mL, Intravenous, PRN  magnesium hydroxide (MILK OF MAGNESIA) 400 MG/5ML suspension 30 mL, 30 mL, Oral, Daily PRN  ondansetron (ZOFRAN) injection 4 mg, 4 mg, Intravenous, Q6H PRN  enoxaparin (LOVENOX) injection 40 mg, 40 mg, Subcutaneous, Daily  daptomycin (CUBICIN) 465 mg in sodium chloride 0.9 % 50 mL IVPB, 6 mg/kg (Ideal), Intravenous, Q24H  acetaminophen (TYLENOL) tablet 1,000 mg, 1,000 mg, Oral, Q8H PRN  ketorolac (TORADOL) injection 30 mg, 30 mg, Intravenous, Q6H PRN    No Known Allergies     REVIEW OF SYSTEMS:    CONSTITUTIONAL:  See HPI. He thinks his weight has been stable   EYES:  negative for blurred vision, eye discharge, acute visual disturbance and icterus  HEENT:  negative for acute hearing loss, tinnitus, ear drainage, sinus pressure, nasal congestion, epistaxis and snoring  RESPIRATORY:  No cough, shortness of breath, hemoptysis  CARDIOVASCULAR:  negative for chest pain, palpitations, exertional chest pressure/discomfort, edema, syncope  GASTROINTESTINAL:  negative for nausea, vomiting, diarrhea, constipation, blood in stool and abdominal pain  GENITOURINARY:  negative for frequency, dysuria, urinary incontinence, decreased urine volume, and hematuria  HEMATOLOGIC/LYMPHATIC:  negative for easy bruising, bleeding and lymphadenopathy  ALLERGIC/IMMUNOLOGIC:  negative for recurrent infections, angioedema, anaphylaxis and drug reactions  ENDOCRINE:  negative for weight changes and diabetic symptoms including polyuria, polydipsia and polyphagia  MUSCULOSKELETAL:  negative for acute joint pain, joint swelling, decreased range of motion and muscle weakness  NEUROLOGICAL:  negative for headaches, slurred speech, unilateral weakness  PSYCHIATRIC/BEHAVIORAL: negative for hallucinations, behavioral problems, confusion and agitation.        Objective:   PHYSICAL EXAM:      VITALS:  /77   Pulse 83   Temp 97.7 °F (36.5 °C) (Oral)   Resp 16   Ht 6' (1.829 m)   Wt 153 lb (69.4 kg)   SpO2 99%   BMI 20.75 kg/m²      24HR INTAKE/OUTPUT:  No intake or output data in the 24 hours ending 05/30/19 1023  CONSTITUTIONAL:  Awake, alert, cooperative, no apparent distress, and appears stated age  Flat affect   HEENT: NCAT, PERRL, EOMI. Sclera white, conjunctiva full. OP with moist mucosal membranes, no thrush, tongue protrudes midline  NECK:  Supple, symmetrical, trachea midline, no adenopathy  LUNGS:  no increased work of breathing, CTA elke without W/R/R  CARDIOVASCULAR:  RRR without murmur  ABDOMEN:  normal bowel sounds, soft, flat, NT  MUSCULOSKELETAL: No obvious misalignment or effusion of the joints. No clubbing, cyanosis of the digits. SKIN:  Focal crop of erythematous tiny papules R medial aspect distal leg   Wound on back, granular base without surrounding erythema or induration   NEUROLOGIC: nonfocal exam  Track marks  ACCESS:  IV site ok       DATA:    Old records have been reviewed    CBC:  Recent Labs     05/30/19  0149   WBC 13.2*   RBC 3.90*   HGB 11.6*   HCT 35.5*      MCV 91.0   MCH 29.8   MCHC 32.8   RDW 13.5      BMP:  Recent Labs     05/30/19  0149      K 3.7   CL 98*   CO2 32   BUN 13   CREATININE 0.7*   CALCIUM 9.6   GLUCOSE 95        Cultures:   5/24 BC x1 MRSA    Antibiotic Method Susceptibility   Unknown Organism Clindamycin BONY 0.25: Susceptible    Doxycycline BONY <=0.5: Susceptible    Erythromycin BONY >=8: Resistant    Oxacillin BONY >=4: Resistant    Sulfa/Trimethoprim BONY <=10: Susceptible    Vancomycin BONY 1: Susceptible         Radiology Review:  All pertinent images / reports were reviewed as a part of this visit.        Assessment:     Patient Active Problem List   Diagnosis    Cellulitis, face       Eben Mortimer is a 36yoM who is evaluated for the following:    IVDU, daily use of fentanyl     Facial cellulitis s/p bedside I&D at [facility]  Soft tissue abscess on the back, resolving     MRSA bacteremia may be primary endovascular infection or secondary to either of the soft tissue infections  He had +BC at [facility] and [facility] on 5/24/19  No murmur but will need repeat BC and ARSALAN to define length of treatment     Chronic HCV infection   HIV screen neg 5/23/19    NKDA      Recs:  Continue dapto as ordered  Repeat BC were ordered  Ask cardiology for ARSALAN  Not candidate for outpt IV abx. Could place in facility for supervised IV abx vs daily PIV placement and IV therapy at infusion center vs oral therapy. Recs and length of treatment TBD by repeat BC, echo, etc    If he signs out AMA, would prescribe cipro/rifampin     We will follow     Kailyn Villalobos M.D. Thank you for the opportunity to participate in the care of your patient.     Please do not hesitate to contact me:   [phone] office  [phone] mobile

## 2019-05-30 NOTE — PROGRESS NOTES
Pt admitted to room 4279. Note wound on left scapular region, swabbed. Pt states he was told it was MRSA, placed in precautions, contact. Instructed on precautions. Oriented to room and use of call light. Pt desires to have IV moved to left arm, completed as charted. Pt requesting food, \"I havent eaten all day. \" on general diet. Sandwiches and beverages given. Call light in reach, discussed fall precautions.

## 2019-05-30 NOTE — PROGRESS NOTES
4 Eyes Skin Assessment     The patient is being assess for  Admission    I agree that 2 RN's have performed a thorough Head to Toe Skin Assessment on the patient. ALL assessment sites listed below have been assessed. Areas assessed by both nurses:   [x]   Head, Face, and Ears   [x]   Shoulders, Back, and Chest  [x]   Arms, Elbows, and Hands   [x]   Coccyx, Sacrum, and IschIum  [x]   Legs, Feet, and Heels        Does the Patient have Skin Breakdown?   Yes LDA WOUND CARE was Initiated documentation include the Leah-wound, Wound Assessment, Measurements, Dressing Treatment, Drainage, and Color\",         Keenan Prevention initiated:  NA   Wound Care Orders initiated:  Yes      WOC nurse consulted for Pressure Injury (Stage 3,4, Unstageable, DTI, NWPT, and Complex wounds), New and Established Ostomies:  No     Nurse 1 eSignature: Mike Montgomery RN    **SHARE this note so that the co-signing nurse is able to place an eSignature**    Nurse 2 eSignature: Electronically signed by Felicita Martines RN on 5/30/19 at 5:29 AM

## 2019-05-30 NOTE — H&P
Paternal Grandmother        REVIEW OF SYSTEMS:   Positive for facial swelling and as noted in the HPI. All other systems reviewed and negative. PHYSICAL EXAM:    /77   Pulse 83   Temp 97.7 °F (36.5 °C) (Oral)   Resp 16   Ht 6' (1.829 m)   Wt 153 lb (69.4 kg)   SpO2 99%   BMI 20.75 kg/m²     General appearance: No apparent distress, sleepy  HEENT left side of face with mild edema. PERRL. EOM. Conjunctivae/corneas clear. Neck: Supple, No jugular venous distention/bruits. Trachea midline   Lungs: Clear to auscultation, bilaterally without Rales/Wheezes/Rhonchi without accessory muscle use. Heart: Regular rate and rhythm with Normal S1/S2 without murmurs, rubs or gallops  Abdomen: Soft, non-tender or non-distended without rigidity or guarding and positive bowel sounds all four quadrants. Extremities: No clubbing, cyanosis, or edema bilaterally. Skin: Skin color, texture, turgor normal.  No rashes or lesions. Neurologic: Alert and oriented X 3, neurovascularly intact with sensory/motor intact upper extremities/lower extremities, bilaterally. Grossly non-focal.  Mental status: Alert, oriented, thought content appropriate. Peripheral Pulses: +3 Easily felt, not easily obliterated with pressure  Cap refill  +2 sec    CXR:  I have reviewed the CXR with the following interpretation: not done    CBC   Recent Labs     05/30/19 0149   WBC 13.2*   HGB 11.6*   HCT 35.5*         RENAL  Recent Labs     05/30/19 0149      K 3.7   CL 98*   CO2 32   BUN 13   CREATININE 0.7*     LFT'S  Recent Labs     05/30/19 0149   *   *   BILITOT <0.2   ALKPHOS 68     COAG  No results for input(s): INR in the last 72 hours. CARDIAC ENZYMES  No results for input(s): CKTOTAL, CKMB, CKMBINDEX, TROPONINI in the last 72 hours.     U/A:    Lab Results   Component Value Date    COLORU Yellow 10/07/2017    WBCUA None seen 10/07/2017    RBCUA 0-2 10/07/2017    BACTERIA Rare 10/07/2017    CLARITYU Clear

## 2019-05-30 NOTE — CARE COORDINATION
Met w/ this pt. Confirmed address in [place] is his grandmothers home and states that he may return at [place]. Has [employer] as health coverage. Per Dr Boston Nguyễn will need at least 2 weeks IV AB for + blood cultures, pending echo may determine that he will need 6-8 weeks. Discussed w/ pt and he was very upset that antibiotics cannot be po. ID consult pending.    Electronically signed by Radha Tapia RN on 5/30/2019 at 12:34 PM

## 2019-05-31 LAB
A/G RATIO: 1 (ref 1.1–2.2)
ALBUMIN SERPL-MCNC: 3.8 G/DL (ref 3.4–5)
ALP BLD-CCNC: 66 U/L (ref 40–129)
ALT SERPL-CCNC: 115 U/L (ref 10–40)
ANION GAP SERPL CALCULATED.3IONS-SCNC: 9 MMOL/L (ref 3–16)
AST SERPL-CCNC: 114 U/L (ref 15–37)
BASOPHILS ABSOLUTE: 0.1 K/UL (ref 0–0.2)
BASOPHILS RELATIVE PERCENT: 0.9 %
BILIRUB SERPL-MCNC: 0.3 MG/DL (ref 0–1)
BUN BLDV-MCNC: 9 MG/DL (ref 7–20)
CALCIUM SERPL-MCNC: 9.8 MG/DL (ref 8.3–10.6)
CHLORIDE BLD-SCNC: 100 MMOL/L (ref 99–110)
CO2: 28 MMOL/L (ref 21–32)
CREAT SERPL-MCNC: 0.7 MG/DL (ref 0.9–1.3)
EOSINOPHILS ABSOLUTE: 0.3 K/UL (ref 0–0.6)
EOSINOPHILS RELATIVE PERCENT: 3.1 %
GFR AFRICAN AMERICAN: >60
GFR NON-AFRICAN AMERICAN: >60
GLOBULIN: 3.7 G/DL
GLUCOSE BLD-MCNC: 96 MG/DL (ref 70–99)
HCT VFR BLD CALC: 36.4 % (ref 40.5–52.5)
HEMOGLOBIN: 12.3 G/DL (ref 13.5–17.5)
LV EF: 53 %
LVEF MODALITY: NORMAL
LYMPHOCYTES ABSOLUTE: 2.2 K/UL (ref 1–5.1)
LYMPHOCYTES RELATIVE PERCENT: 22.1 %
MCH RBC QN AUTO: 31 PG (ref 26–34)
MCHC RBC AUTO-ENTMCNC: 33.6 G/DL (ref 31–36)
MCV RBC AUTO: 92 FL (ref 80–100)
MONOCYTES ABSOLUTE: 1.4 K/UL (ref 0–1.3)
MONOCYTES RELATIVE PERCENT: 14.3 %
NEUTROPHILS ABSOLUTE: 5.9 K/UL (ref 1.7–7.7)
NEUTROPHILS RELATIVE PERCENT: 59.6 %
PDW BLD-RTO: 14 % (ref 12.4–15.4)
PLATELET # BLD: 403 K/UL (ref 135–450)
PMV BLD AUTO: 6.3 FL (ref 5–10.5)
POTASSIUM SERPL-SCNC: 4.7 MMOL/L (ref 3.5–5.1)
RBC # BLD: 3.96 M/UL (ref 4.2–5.9)
SODIUM BLD-SCNC: 137 MMOL/L (ref 136–145)
TOTAL CK: 47 U/L (ref 39–308)
TOTAL PROTEIN: 7.5 G/DL (ref 6.4–8.2)
WBC # BLD: 9.8 K/UL (ref 4–11)

## 2019-05-31 PROCEDURE — 6360000002 HC RX W HCPCS: Performed by: FAMILY MEDICINE

## 2019-05-31 PROCEDURE — 80053 COMPREHEN METABOLIC PANEL: CPT

## 2019-05-31 PROCEDURE — 6360000002 HC RX W HCPCS: Performed by: INTERNAL MEDICINE

## 2019-05-31 PROCEDURE — 94760 N-INVAS EAR/PLS OXIMETRY 1: CPT

## 2019-05-31 PROCEDURE — 1200000000 HC SEMI PRIVATE

## 2019-05-31 PROCEDURE — 85025 COMPLETE CBC W/AUTO DIFF WBC: CPT

## 2019-05-31 PROCEDURE — 82550 ASSAY OF CK (CPK): CPT

## 2019-05-31 PROCEDURE — 36415 COLL VENOUS BLD VENIPUNCTURE: CPT

## 2019-05-31 PROCEDURE — 93306 TTE W/DOPPLER COMPLETE: CPT

## 2019-05-31 PROCEDURE — 2580000003 HC RX 258: Performed by: INTERNAL MEDICINE

## 2019-05-31 RX ADMIN — Medication 10 ML: at 22:04

## 2019-05-31 RX ADMIN — KETOROLAC TROMETHAMINE 30 MG: 30 INJECTION, SOLUTION INTRAMUSCULAR at 22:26

## 2019-05-31 RX ADMIN — KETOROLAC TROMETHAMINE 30 MG: 30 INJECTION, SOLUTION INTRAMUSCULAR at 09:22

## 2019-05-31 RX ADMIN — DAPTOMYCIN 465 MG: 500 INJECTION, POWDER, LYOPHILIZED, FOR SOLUTION INTRAVENOUS at 09:23

## 2019-05-31 ASSESSMENT — PAIN DESCRIPTION - ONSET: ONSET: ON-GOING

## 2019-05-31 ASSESSMENT — PAIN DESCRIPTION - LOCATION
LOCATION: FACE
LOCATION: FACE

## 2019-05-31 ASSESSMENT — PAIN - FUNCTIONAL ASSESSMENT: PAIN_FUNCTIONAL_ASSESSMENT: ACTIVITIES ARE NOT PREVENTED

## 2019-05-31 ASSESSMENT — PAIN DESCRIPTION - ORIENTATION
ORIENTATION: LEFT
ORIENTATION: LEFT

## 2019-05-31 ASSESSMENT — PAIN SCALES - GENERAL
PAINLEVEL_OUTOF10: 3
PAINLEVEL_OUTOF10: 7
PAINLEVEL_OUTOF10: 8

## 2019-05-31 ASSESSMENT — PAIN DESCRIPTION - PAIN TYPE
TYPE: ACUTE PAIN
TYPE: ACUTE PAIN

## 2019-05-31 ASSESSMENT — PAIN DESCRIPTION - DESCRIPTORS
DESCRIPTORS: ACHING
DESCRIPTORS: CONSTANT

## 2019-05-31 ASSESSMENT — PAIN DESCRIPTION - PROGRESSION: CLINICAL_PROGRESSION: NOT CHANGED

## 2019-05-31 ASSESSMENT — PAIN DESCRIPTION - FREQUENCY: FREQUENCY: CONTINUOUS

## 2019-05-31 NOTE — PROGRESS NOTES
Changed left scapula dressing. Cleaned surrounding skin with bath wipes. Patient denies pain from site. Patient advised that he can eat and call for lunch when ready.  Electronically signed by Katalina Mcfadden RN on 5/31/2019 at 1:32 PM

## 2019-05-31 NOTE — PLAN OF CARE
Problem: SAFETY  Goal: Free from accidental physical injury  5/31/2019 0943 by Stuart Cabot, RN  Outcome: Ongoing     Problem: SAFETY  Goal: Free from intentional harm  5/31/2019 0943 by Stuart Cabot, RN  Outcome: Ongoing     Problem: DAILY CARE  Goal: Daily care needs are met  5/31/2019 0943 by Stuart Cabot, RN  Outcome: Ongoing     Problem: PAIN  Goal: Patient's pain/discomfort is manageable  5/31/2019 0943 by Stuart Cabot, RN  Outcome: Ongoing     Problem: SKIN INTEGRITY  Goal: Skin integrity is maintained or improved  5/31/2019 0943 by Stuart Cabot, RN  Outcome: Ongoing     Problem: KNOWLEDGE DEFICIT  Goal: Patient/S.O. demonstrates understanding of disease process, treatment plan, medications, and discharge instructions.   5/31/2019 1509 by Stuart Cabot, RN  Outcome: Ongoing     Problem: DISCHARGE BARRIERS  Goal: Patient's continuum of care needs are met  5/31/2019 0943 by Stuart Cabot, RN  Outcome: Ongoing     Problem: Pain:  Goal: Pain level will decrease  Description  Pain level will decrease  5/31/2019 0943 by Stuart Cabot, RN  Outcome: Ongoing     Problem: Pain:  Goal: Control of acute pain  Description  Control of acute pain  5/31/2019 0943 by Stuart Cabot, RN  Outcome: Ongoing     Problem: Pain:  Goal: Control of chronic pain  Description  Control of chronic pain  5/31/2019 0943 by Stuart Cabot, RN  Outcome: Ongoing

## 2019-05-31 NOTE — PROGRESS NOTES
Awake alert oriented. Pleasant and cooperative. Rates left facial pain 8/10. Gave toradol 30 mg IVP. Redness and edema to left lip/cheek. Denies nausea. Reports mild anxiety but states that it is his norm. COWS score 1. Heplock intact. Lungs clear. Positive bowel sounds. Reports BM yesterday. Dressing to left scapula/back with small amount old drainage. Patient updated on plan of care. Echo planned for today. Call light in reach.  Will monitor Electronically signed by Patricio Kayser, RN on 5/31/2019 at 9:52 AM

## 2019-05-31 NOTE — PROGRESS NOTES
Hospitalist Progress Note    CC: <principal problem not specified>      Admit date: 5/30/2019  Days in hospital:  1    Subjective/interval history: Pt S/E. No new complaints. ROS:   Pertinent items are noted in HPI. Objective:    /70   Pulse 83   Temp 98.1 °F (36.7 °C) (Oral)   Resp 18   Ht 6' (1.829 m)   Wt 146 lb 2.6 oz (66.3 kg)   SpO2 97%   BMI 19.82 kg/m²     Gen: alert, NAD  HEENT: NC/AT, moist mucous membranes  Neck: supple, trachea midline  Heart: Normal s1/s2, RRR, no murmurs, gallops, or rubs. Lungs: clear bilaterally, no wheezing, no rales, no rhonchi, no use of accessory muscles  Abd: bowel sounds present, soft, nontender, nondistended, no masses  Extrem: No clubbing, cyanosis, no edema  Skin: no rashes or lesions  Psych: A & O x3, affect appropriate  Neuro: grossly intact, moves all four extremities spontaneously. Cap refill: +2 sec    Medications:  Scheduled Meds:   sodium chloride flush  10 mL Intravenous 2 times per day    enoxaparin  40 mg Subcutaneous Daily    daptomycin (CUBICIN) IVPB  6 mg/kg (Ideal) Intravenous Q24H       PRN Meds:  perflutren lipid microspheres, sodium chloride flush, magnesium hydroxide, ondansetron, acetaminophen, ketorolac, traMADol **AND** cloNIDine, dicyclomine, hydrOXYzine, perflutren lipid microspheres    IV:        Intake/Output Summary (Last 24 hours) at 5/31/2019 0856  Last data filed at 5/30/2019 1406  Gross per 24 hour   Intake 640 ml   Output --   Net 640 ml       Results:  CBC:   Recent Labs     05/30/19  0149 05/31/19  0801   WBC 13.2* 9.8   HGB 11.6* 12.3*   HCT 35.5* 36.4*   MCV 91.0 92.0    403     BMP:   Recent Labs     05/30/19  0149 05/31/19  0801    137   K 3.7 4.7   CL 98* 100   CO2 32 28   BUN 13 9   CREATININE 0.7* 0.7*     Mag: No results for input(s): MAG in the last 72 hours.   Phos: No results found for: PHOS  No components found for: GLU    LIVER PROFILE:   Recent Labs     05/30/19  0149 05/31/19  0801   * 114*   * 115*   BILITOT <0.2 0.3   ALKPHOS 68 66     PT/INR: No results for input(s): PROTIME, INR in the last 72 hours. APTT: No results for input(s): APTT in the last 72 hours. UA:No results for input(s): NITRITE, COLORU, PHUR, LABCAST, WBCUA, RBCUA, MUCUS, TRICHOMONAS, YEAST, BACTERIA, CLARITYU, SPECGRAV, LEUKOCYTESUR, UROBILINOGEN, BILIRUBINUR, BLOODU, GLUCOSEU, AMORPHOUS in the last 72 hours. Invalid input(s): Edin Loyd input(s): ABG  Lab Results   Component Value Date    CALCIUM 9.8 05/31/2019       Assessment:    Active Problems:    Cellulitis, face  Resolved Problems:    * No resolved hospital problems. [facility] course: a 33 y.o. male with a H/O IVDA, admitted with facial cellulitis, was treated at [facility] with improvement but left AMA yesterday. When he left he used IV. He was supposed to get an ECHO to RO endocarditis as he had positive blood cultures for MRSA. He initially presented here one week ago with facial swelling and had an I&D and discharged on clinda. He then went to  but left as he didn't like his treatment. He was admitted on 5/24 at [facility] and was being seen by infectious disease due to positive blood cultures. Kenan Zamora has been receiving daptomycin, cefepime and Flagyl, last doses were the am of leaving. Per ID's last note he was to be treated with only daptomycin 400 mg daily. Of note, he was stared on suboxone at [facility], but states he wants to try and stop it now.      Plan:  Facial cellulitis  - cont daptomycin  - consult ID     MRSA bacteremia  - check procalcitonin - .13  - cont daptomycin  - blood cultures taken  - will order an ECHO  - needs a ARSALAN  - per ID, if he signs out AMA, would prescribe cipro/rifampin\"      Elevated LFTs  - mild,   - chronic Hep C  - will check acute hepatitis panel and HCV quant     IVDA  - caution with pain control  - will give toradol  - COWS: tramadol prn withdrawals  - discussed continuing suboxone, he

## 2019-05-31 NOTE — PLAN OF CARE
Problem: SAFETY  Goal: Free from accidental physical injury  Outcome: Ongoing  Goal: Free from intentional harm  Outcome: Ongoing     Problem: KNOWLEDGE DEFICIT  Goal: Patient/S.O. demonstrates understanding of disease process, treatment plan, medications, and discharge instructions.   Outcome: Ongoing

## 2019-05-31 NOTE — CARE COORDINATION
To have ARSALAN today, IVAB at dc will depend upon these results, await results    Electronically signed by Tonia Solitario RN on 5/31/2019 at 1:04 PM

## 2019-05-31 NOTE — PROGRESS NOTES
ARSALAN cancelled as cardio would like Echo first. Patient has been NPO for possible ARSALAN later today. Echo still not done as of right now. Discussed with Hu Mosqueda in cardiology office. Mirlande Bush for patient to eat.  Electronically signed by Nisa High RN on 5/31/2019 at 1:28 PM

## 2019-06-01 PROBLEM — B95.62 MRSA BACTEREMIA: Status: ACTIVE | Noted: 2019-06-01

## 2019-06-01 PROBLEM — R78.81 MRSA BACTEREMIA: Status: ACTIVE | Noted: 2019-06-01

## 2019-06-01 PROBLEM — F19.90 IVDU (INTRAVENOUS DRUG USER): Status: ACTIVE | Noted: 2019-06-01

## 2019-06-01 PROCEDURE — 2580000003 HC RX 258: Performed by: INTERNAL MEDICINE

## 2019-06-01 PROCEDURE — 94760 N-INVAS EAR/PLS OXIMETRY 1: CPT

## 2019-06-01 PROCEDURE — 6360000002 HC RX W HCPCS: Performed by: FAMILY MEDICINE

## 2019-06-01 PROCEDURE — 6370000000 HC RX 637 (ALT 250 FOR IP): Performed by: FAMILY MEDICINE

## 2019-06-01 PROCEDURE — 6360000002 HC RX W HCPCS: Performed by: INTERNAL MEDICINE

## 2019-06-01 PROCEDURE — 99232 SBSQ HOSP IP/OBS MODERATE 35: CPT | Performed by: INTERNAL MEDICINE

## 2019-06-01 PROCEDURE — 1200000000 HC SEMI PRIVATE

## 2019-06-01 RX ADMIN — DAPTOMYCIN 465 MG: 500 INJECTION, POWDER, LYOPHILIZED, FOR SOLUTION INTRAVENOUS at 09:20

## 2019-06-01 RX ADMIN — Medication 10 ML: at 21:37

## 2019-06-01 RX ADMIN — TRAMADOL HYDROCHLORIDE 50 MG: 50 TABLET, FILM COATED ORAL at 22:15

## 2019-06-01 RX ADMIN — KETOROLAC TROMETHAMINE 30 MG: 30 INJECTION, SOLUTION INTRAMUSCULAR at 21:37

## 2019-06-01 RX ADMIN — Medication 10 ML: at 09:20

## 2019-06-01 RX ADMIN — KETOROLAC TROMETHAMINE 30 MG: 30 INJECTION, SOLUTION INTRAMUSCULAR at 09:24

## 2019-06-01 RX ADMIN — Medication 10 ML: at 20:15

## 2019-06-01 ASSESSMENT — PAIN SCALES - GENERAL
PAINLEVEL_OUTOF10: 10
PAINLEVEL_OUTOF10: 7
PAINLEVEL_OUTOF10: 8
PAINLEVEL_OUTOF10: 4
PAINLEVEL_OUTOF10: 7

## 2019-06-01 ASSESSMENT — PAIN DESCRIPTION - LOCATION
LOCATION: FACE

## 2019-06-01 ASSESSMENT — PAIN DESCRIPTION - FREQUENCY
FREQUENCY: CONTINUOUS

## 2019-06-01 ASSESSMENT — PAIN DESCRIPTION - ONSET
ONSET: ON-GOING

## 2019-06-01 ASSESSMENT — PAIN DESCRIPTION - DESCRIPTORS
DESCRIPTORS: ACHING

## 2019-06-01 ASSESSMENT — PAIN DESCRIPTION - PAIN TYPE
TYPE: ACUTE PAIN

## 2019-06-01 ASSESSMENT — PAIN DESCRIPTION - ORIENTATION
ORIENTATION: LEFT

## 2019-06-01 ASSESSMENT — PAIN DESCRIPTION - PROGRESSION
CLINICAL_PROGRESSION: NOT CHANGED
CLINICAL_PROGRESSION: GRADUALLY IMPROVING

## 2019-06-01 ASSESSMENT — PAIN - FUNCTIONAL ASSESSMENT
PAIN_FUNCTIONAL_ASSESSMENT: ACTIVITIES ARE NOT PREVENTED
PAIN_FUNCTIONAL_ASSESSMENT: ACTIVITIES ARE NOT PREVENTED

## 2019-06-01 NOTE — PLAN OF CARE
Problem: SAFETY  Goal: Free from accidental physical injury  Outcome: Ongoing  Note:   Pt A&O aware of his abilities. Pt understands and knows to call when in need of ambulation. Measures were made to prevent accidental needle stick, friction, shear, etc. Environment kept free of clutter and adequate lighting provided. Will continue to monitor for physical injury. Problem: DAILY CARE  Goal: Daily care needs are met  Outcome: Ongoing     Problem: PAIN  Goal: Patient's pain/discomfort is manageable  Outcome: Ongoing  Note:   Pain/discomfort being managed with PRN analgesics per MD order. Pt able to express presence and absence of pain and rate pain appropriately using numerical scale       Problem: SKIN INTEGRITY  Goal: Skin integrity is maintained or improved  Outcome: Ongoing  Note:   Skin assessment completed every shift. Pt assessed for incontinence, appropriate barrier cream applied prn as needed. Pt encouraged to turn/rotate every 2 hours. Assistance provided if pt unable to do so themselves.

## 2019-06-01 NOTE — PROGRESS NOTES
found for: PHOS  No components found for: GLU    LIVER PROFILE:   Recent Labs     05/30/19  0149 05/31/19  0801   * 114*   * 115*   BILITOT <0.2 0.3   ALKPHOS 68 66     PT/INR: No results for input(s): PROTIME, INR in the last 72 hours. APTT: No results for input(s): APTT in the last 72 hours. UA:No results for input(s): NITRITE, COLORU, PHUR, LABCAST, WBCUA, RBCUA, MUCUS, TRICHOMONAS, YEAST, BACTERIA, CLARITYU, SPECGRAV, LEUKOCYTESUR, UROBILINOGEN, BILIRUBINUR, BLOODU, GLUCOSEU, AMORPHOUS in the last 72 hours. Invalid input(s): Baby Perches input(s): ABG  Lab Results   Component Value Date    CALCIUM 9.8 05/31/2019       Assessment:    Active Problems:    Cellulitis, face  Resolved Problems:    * No resolved hospital problems. [facility] course: a 33 y.o. male with a H/O IVDA, admitted with facial cellulitis, was treated at [facility] with improvement but left AMA yesterday. When he left he used IV. He was supposed to get an ECHO to RO endocarditis as he had positive blood cultures for MRSA. He initially presented here one week ago with facial swelling and had an I&D and discharged on clinda. He then went to  but left as he didn't like his treatment. He was admitted on 5/24 at [facility] and was being seen by infectious disease due to positive blood cultures. [facility] has been receiving daptomycin, cefepime and Flagyl, last doses were the am of leaving. Per ID's last note he was to be treated with only daptomycin 400 mg daily. Of note, he was stared on suboxone at [facility], but states he wants to try and stop it now.      Plan:  Facial cellulitis  - cont daptomycin  - consult ID     MRSA bacteremia  - check procalcitonin - .13  - cont daptomycin  - blood and wound cultures NG x2 days  - ECHO without valve vegeation  - hold off on TE for now per cardiology  - per ID, if he signs out AMA, would prescribe cipro/rifampin\"      Elevated LFTs -decreasing  - mild,    - chronic Hep C  - will check acute hepatitis panel and HCV quant     IVDA  - caution with pain control  - will give toradol  - COWS: tramadol prn withdrawals  - discussed continuing suboxone, he states he wants to try to avoid it for now as I can't prescribe it after leaving the hospital    Code status:  full  DVT prophylaxis: [x] Lovenox  [] SQ Heparin  [] SCDs because of  [] warfarin/oral direct thrombin inhibitor [] Encourage ambulation      Disposition:  [] Home [] Rehab [] Psych [] SNF  [] LTAC  [] Transfer to ICU  [] Transfer to PCU [] Other:    In pt    Electronically signed by Nandini Mahmood DO on 6/1/2019 at 9:05 AM

## 2019-06-02 LAB
GRAM STAIN RESULT: NORMAL
HCV QNT BY NAAT IU/ML: ABNORMAL IU/ML
HCV QNT BY NAAT LOG IU/ML: <1 LOG IU/ML
INTERPRETATION: DETECTED
WOUND/ABSCESS: NORMAL

## 2019-06-02 PROCEDURE — 94760 N-INVAS EAR/PLS OXIMETRY 1: CPT

## 2019-06-02 PROCEDURE — 1200000000 HC SEMI PRIVATE

## 2019-06-02 PROCEDURE — 6360000002 HC RX W HCPCS: Performed by: INTERNAL MEDICINE

## 2019-06-02 PROCEDURE — 2580000003 HC RX 258: Performed by: INTERNAL MEDICINE

## 2019-06-02 PROCEDURE — 6360000002 HC RX W HCPCS: Performed by: FAMILY MEDICINE

## 2019-06-02 PROCEDURE — 6370000000 HC RX 637 (ALT 250 FOR IP): Performed by: FAMILY MEDICINE

## 2019-06-02 RX ORDER — TRAMADOL HYDROCHLORIDE 50 MG/1
100 TABLET ORAL EVERY 6 HOURS PRN
Status: DISCONTINUED | OUTPATIENT
Start: 2019-06-02 | End: 2019-06-04 | Stop reason: HOSPADM

## 2019-06-02 RX ORDER — TRAMADOL HYDROCHLORIDE 50 MG/1
50 TABLET ORAL EVERY 6 HOURS PRN
Status: DISCONTINUED | OUTPATIENT
Start: 2019-06-02 | End: 2019-06-04 | Stop reason: HOSPADM

## 2019-06-02 RX ADMIN — TRAMADOL HYDROCHLORIDE 100 MG: 50 TABLET, FILM COATED ORAL at 20:58

## 2019-06-02 RX ADMIN — Medication 10 ML: at 10:11

## 2019-06-02 RX ADMIN — DAPTOMYCIN 465 MG: 500 INJECTION, POWDER, LYOPHILIZED, FOR SOLUTION INTRAVENOUS at 10:10

## 2019-06-02 RX ADMIN — Medication 10 ML: at 20:58

## 2019-06-02 RX ADMIN — KETOROLAC TROMETHAMINE 30 MG: 30 INJECTION, SOLUTION INTRAMUSCULAR at 20:58

## 2019-06-02 ASSESSMENT — PAIN DESCRIPTION - PROGRESSION

## 2019-06-02 ASSESSMENT — PAIN SCALES - GENERAL
PAINLEVEL_OUTOF10: 0
PAINLEVEL_OUTOF10: 9
PAINLEVEL_OUTOF10: 0
PAINLEVEL_OUTOF10: 7
PAINLEVEL_OUTOF10: 4

## 2019-06-02 ASSESSMENT — PAIN DESCRIPTION - LOCATION
LOCATION: FACE
LOCATION: FACE

## 2019-06-02 ASSESSMENT — PAIN DESCRIPTION - FREQUENCY
FREQUENCY: CONTINUOUS
FREQUENCY: CONTINUOUS

## 2019-06-02 ASSESSMENT — PAIN DESCRIPTION - PAIN TYPE
TYPE: ACUTE PAIN
TYPE: ACUTE PAIN

## 2019-06-02 ASSESSMENT — PAIN DESCRIPTION - ORIENTATION
ORIENTATION: LEFT
ORIENTATION: LEFT

## 2019-06-02 ASSESSMENT — PAIN DESCRIPTION - DESCRIPTORS
DESCRIPTORS: ACHING
DESCRIPTORS: ACHING

## 2019-06-02 ASSESSMENT — PAIN DESCRIPTION - ONSET
ONSET: ON-GOING
ONSET: ON-GOING

## 2019-06-02 NOTE — PROGRESS NOTES
Nursing supervisor called to patient room to discuss pain medication regimen per pt request.  Electronically signed by Tacos Schmidt RN on 6/1/2019 at 10:19 PM

## 2019-06-02 NOTE — FLOWSHEET NOTE
Patient appears to be sleeping thru window to room. Was asked to not bother patient thru the night.     Electronically signed by Silvana Boucher RN on 6/2/2019 at 1:27 AM

## 2019-06-02 NOTE — PROGRESS NOTES
Hospitalist Progress Note    CC: <principal problem not specified>      Admit date: 5/30/2019  Days in hospital:  3    Subjective/interval history: Pt S/E. No acute events. He is now asking for narcotics, stating his pain is unbearable. He has only asked for one dose of tramadol since admission however. He has stated that his pain was controlled with the toradol up until today. ROS:   Pertinent items are noted in HPI. Objective:    /63   Pulse 106   Temp 98.1 °F (36.7 °C) (Oral)   Resp 17   Ht 6' (1.829 m)   Wt 151 lb 7.3 oz (68.7 kg)   SpO2 98%   BMI 20.54 kg/m²     Gen: alert, NAD  HEENT: moist mucous membranes, decreased swelling of the left lower face, no erythema  Neck: supple, trachea midline  Heart: Normal s1/s2, RRR, no murmurs, gallops, or rubs. Lungs: clear bilaterally, no wheezing, no rales, no rhonchi, no use of accessory muscles  Abd: bowel sounds present, soft, nontender, nondistended, no masses  Extrem: No clubbing, cyanosis, no edema  Skin: no rashes or lesions  Psych: A & O x3, poor insight  Neuro: grossly intact, moves all four extremities spontaneously.  No focal deficits  Cap refill: +2 sec    Medications:  Scheduled Meds:   sodium chloride flush  10 mL Intravenous 2 times per day    enoxaparin  40 mg Subcutaneous Daily    daptomycin (CUBICIN) IVPB  6 mg/kg (Ideal) Intravenous Q24H       PRN Meds:  perflutren lipid microspheres, sodium chloride flush, magnesium hydroxide, ondansetron, acetaminophen, ketorolac, traMADol **AND** cloNIDine, dicyclomine, hydrOXYzine, perflutren lipid microspheres    IV:        Intake/Output Summary (Last 24 hours) at 6/2/2019 0856  Last data filed at 6/2/2019 0610  Gross per 24 hour   Intake 1370 ml   Output --   Net 1370 ml       Results:  CBC:   Recent Labs     05/31/19  0801   WBC 9.8   HGB 12.3*   HCT 36.4*   MCV 92.0        BMP:   Recent Labs     05/31/19  0801      K 4.7      CO2 28   BUN 9 CREATININE 0.7*     Mag: No results for input(s): MAG in the last 72 hours. Phos: No results found for: PHOS  No components found for: GLU    LIVER PROFILE:   Recent Labs     05/31/19  0801   *   *   BILITOT 0.3   ALKPHOS 66     PT/INR: No results for input(s): PROTIME, INR in the last 72 hours. APTT: No results for input(s): APTT in the last 72 hours. UA:No results for input(s): NITRITE, COLORU, PHUR, LABCAST, WBCUA, RBCUA, MUCUS, TRICHOMONAS, YEAST, BACTERIA, CLARITYU, SPECGRAV, LEUKOCYTESUR, UROBILINOGEN, BILIRUBINUR, BLOODU, GLUCOSEU, AMORPHOUS in the last 72 hours. Invalid input(s): Bianka Lopez input(s): ABG  Lab Results   Component Value Date    CALCIUM 9.8 05/31/2019       Assessment:    Active Problems:    Facial cellulitis    MRSA bacteremia    IVDU (intravenous drug user)  Resolved Problems:    * No resolved hospital problems. [facility] course: a 33 y.o. male with a H/O IVDA, admitted with facial cellulitis, was treated at [facility] with improvement but left AMA yesterday. When he left he used IV. He was supposed to get an ECHO to RO endocarditis as he had positive blood cultures for MRSA. He initially presented here one week ago with facial swelling and had an I&D and discharged on clinda. He then went to  but left as he didn't like his treatment. He was admitted on 5/24 at [facility] and was being seen by infectious disease due to positive blood cultures. [facility] has been receiving daptomycin, cefepime and Flagyl, last doses were the am of leaving. Per ID's last note he was to be treated with only daptomycin 400 mg daily. Of note, he was stared on suboxone at [facility], but states he wants to try and stop it now. Plan:  Facial cellulitis  - cont daptomycin  - consult ID: long term Abx recs:    If ARSALAN done and neg, rx x 2 weeks, through 6/12  If ARSALAN not done, rx x 4 weeks, through 6/26  If ARSALAN with vegetation, rx x 6 weeks, through 7/10     MRSA bacteremia  - check procalcitonin - .13  - cont daptomycin  - blood and wound cultures NG x2 days  - ECHO without valve vegeation  - hold off on TE for now per cardiology  - per ID, if he signs out AMA, would prescribe cipro/rifampin\"      Elevated LFTs -decreasing  - mild,    - chronic Hep C  - will check acute hepatitis panel and HCV quant     IVDA  - caution with pain control  - will give toradol  - COWS: tramadol prn withdrawals  - discussed continuing suboxone, he previously stated he wants to try to avoid taking it. He now however wants narcotics for his pain. I explained that I'm not going to give him anything other that tramadol or toradol as that his pain has been controlled up until today. His last use of IV heroin/fentanyl was the day of admission. Code status:  full  DVT prophylaxis: [x] Lovenox  [] SQ Heparin  [] SCDs because of  [] warfarin/oral direct thrombin inhibitor [] Encourage ambulation      Disposition:  [] Home [] Rehab [] Psych [] SNF  [] LTAC  [] Transfer to ICU  [] Transfer to PCU [] Other:    In pt    Electronically signed by Laurene Angelucci, DO on 6/2/2019 at 8:56 AM

## 2019-06-02 NOTE — PROGRESS NOTES
Pt complaining of 9 out of 10 pain. Refusing PRN Toradol and Ultram stating that they \"don't work\". Made Dr. Caro Pedraza aware. Orders received. Will continue to monitor.

## 2019-06-02 NOTE — PROGRESS NOTES
Patient is A&O x3.  RA, sat 99%. No complaints of SOB. Medicated for c/o left side facial pain as needed. Patient stating that current pain medication regimen not keeping pain under control. Will contact provider for additional pain medications per patient request.  Respirations appear to easy and unlabored. Lungs clear. Respirations easy with no complaints of cough. No complaints of nausea/vomiting/diarrhea. Up ad elizabeth to the bathroom/BSC as needed. Tolerating regular diet. Patient stating that he is having pain in his face when eating. Plan of care and safety measures reviewed with the patient. Call light in reach. Will continue to monitor.   Electronically signed by Jatinder Jin RN on 6/1/2019 at 10:20 PM

## 2019-06-03 PROCEDURE — 6370000000 HC RX 637 (ALT 250 FOR IP): Performed by: FAMILY MEDICINE

## 2019-06-03 PROCEDURE — 1200000000 HC SEMI PRIVATE

## 2019-06-03 PROCEDURE — 2580000003 HC RX 258: Performed by: INTERNAL MEDICINE

## 2019-06-03 PROCEDURE — 6360000002 HC RX W HCPCS: Performed by: FAMILY MEDICINE

## 2019-06-03 PROCEDURE — 6360000002 HC RX W HCPCS: Performed by: INTERNAL MEDICINE

## 2019-06-03 RX ADMIN — Medication 10 ML: at 05:18

## 2019-06-03 RX ADMIN — TRAMADOL HYDROCHLORIDE 100 MG: 50 TABLET, FILM COATED ORAL at 05:18

## 2019-06-03 RX ADMIN — DAPTOMYCIN 465 MG: 500 INJECTION, POWDER, LYOPHILIZED, FOR SOLUTION INTRAVENOUS at 09:21

## 2019-06-03 RX ADMIN — KETOROLAC TROMETHAMINE 30 MG: 30 INJECTION, SOLUTION INTRAMUSCULAR at 05:18

## 2019-06-03 ASSESSMENT — PAIN DESCRIPTION - PROGRESSION

## 2019-06-03 ASSESSMENT — PAIN DESCRIPTION - PAIN TYPE: TYPE: ACUTE PAIN

## 2019-06-03 ASSESSMENT — PAIN DESCRIPTION - LOCATION: LOCATION: FACE

## 2019-06-03 ASSESSMENT — PAIN DESCRIPTION - ORIENTATION: ORIENTATION: LEFT

## 2019-06-03 ASSESSMENT — PAIN - FUNCTIONAL ASSESSMENT: PAIN_FUNCTIONAL_ASSESSMENT: ACTIVITIES ARE NOT PREVENTED

## 2019-06-03 ASSESSMENT — PAIN SCALES - GENERAL
PAINLEVEL_OUTOF10: 0
PAINLEVEL_OUTOF10: 0
PAINLEVEL_OUTOF10: 7

## 2019-06-03 ASSESSMENT — PAIN DESCRIPTION - FREQUENCY: FREQUENCY: CONTINUOUS

## 2019-06-03 ASSESSMENT — PAIN DESCRIPTION - ONSET: ONSET: ON-GOING

## 2019-06-03 ASSESSMENT — PAIN DESCRIPTION - DESCRIPTORS: DESCRIPTORS: ACHING

## 2019-06-03 NOTE — CARE COORDINATION
Discussed dc concerns w/ Dr Linda Spence. Will be unable to find SNF placement w/ IV dapto. ARSALAN was not completed Friday as Cardiology wanted to wait for TTE results. Not certain at this time if pt is to have ARSALAN. Will continue to monitor for dc needs. Will endeavor to locate SNF will to take w/ IVDU if he needs protracted IVAB if not dapto.  Will require PICC and precert  Electronically signed by Meli Tyson RN on 6/3/2019 at 2:17 PM

## 2019-06-03 NOTE — PROGRESS NOTES
Patient in bed resting comfortably. Respirations steady and unlabored. No signs of respiratory or cardiac distress. No complaints of pain at this time. No needs at this time. Call light in reach. Will continue to monitor.   Electronically signed by Daniella Wilkes RN on 6/3/2019 at 1:36 AM

## 2019-06-03 NOTE — PROGRESS NOTES
Hospitalist Progress Note    CC: <principal problem not specified>      Admit date: 5/30/2019  Days in hospital:  4    Subjective/interval history: Pt S/E. No acute events, feels well. Denies chest pain. ROS:   Pertinent items are noted in HPI. Objective:    /60   Pulse 96   Temp 97.9 °F (36.6 °C) (Oral)   Resp 20   Ht 6' (1.829 m)   Wt 149 lb 14.6 oz (68 kg)   SpO2 99%   BMI 20.33 kg/m²     Gen: alert, NAD  HEENT: moist mucous membranes, decreased swelling of the left lower face, no erythema  Neck: supple, trachea midline  Heart: Normal s1/s2, RRR, no murmurs, gallops, or rubs. Lungs: clear bilaterally, no wheezing, no rales, no rhonchi, no use of accessory muscles  Abd: bowel sounds present, soft, nontender, nondistended, no masses  Extrem: No clubbing, cyanosis, no edema  Skin: no rashes or lesions  Psych: A & O x3, poor insight  Neuro: grossly intact, moves all four extremities spontaneously. No focal deficits  Cap refill: +2 sec    Medications:  Scheduled Meds:   sodium chloride flush  10 mL Intravenous 2 times per day    enoxaparin  40 mg Subcutaneous Daily    daptomycin (CUBICIN) IVPB  6 mg/kg (Ideal) Intravenous Q24H       PRN Meds:  traMADol **OR** traMADol, perflutren lipid microspheres, sodium chloride flush, magnesium hydroxide, ondansetron, acetaminophen, ketorolac, [DISCONTINUED] traMADol **AND** cloNIDine, dicyclomine, hydrOXYzine, perflutren lipid microspheres    IV:        Intake/Output Summary (Last 24 hours) at 6/3/2019 1028  Last data filed at 6/2/2019 2059  Gross per 24 hour   Intake 300 ml   Output --   Net 300 ml       Results:  CBC:   No results for input(s): WBC, HGB, HCT, MCV, PLT in the last 72 hours. BMP:   No results for input(s): NA, K, CL, CO2, PHOS, BUN, CREATININE in the last 72 hours. Invalid input(s): CA  Mag: No results for input(s): MAG in the last 72 hours.   Phos: No results found for: PHOS  No components found for: GLU    LIVER PROFILE:   No results for input(s): AST, ALT, LIPASE, BILIDIR, BILITOT, ALKPHOS in the last 72 hours. Invalid input(s): AMYLASE,  ALB  PT/INR: No results for input(s): PROTIME, INR in the last 72 hours. APTT: No results for input(s): APTT in the last 72 hours. UA:No results for input(s): NITRITE, COLORU, PHUR, LABCAST, WBCUA, RBCUA, MUCUS, TRICHOMONAS, YEAST, BACTERIA, CLARITYU, SPECGRAV, LEUKOCYTESUR, UROBILINOGEN, BILIRUBINUR, BLOODU, GLUCOSEU, AMORPHOUS in the last 72 hours. Invalid input(s): Jermain Heredia input(s): ABG  Lab Results   Component Value Date    CALCIUM 9.8 05/31/2019       Assessment:    Active Problems:    Facial cellulitis    MRSA bacteremia    IVDU (intravenous drug user)  Resolved Problems:    * No resolved hospital problems. [facility] course: a 33 y.o. male with a H/O IVDA, admitted with facial cellulitis, was treated at [facility] with improvement but left AMA yesterday. When he left he used IV. He was supposed to get an ECHO to RO endocarditis as he had positive blood cultures for MRSA. He initially presented here one week ago with facial swelling and had an I&D and discharged on clinda. He then went to  but left as he didn't like his treatment. He was admitted on 5/24 at [facility] and was being seen by infectious disease due to positive blood cultures. Raymond Mendoza has been receiving daptomycin, cefepime and Flagyl, last doses were the am of leaving. Per ID's last note he was to be treated with only daptomycin 400 mg daily. Of note, he was stared on suboxone at [facility], but states he wants to try and stop it now. Plan:  Facial cellulitis  - cont daptomycin  - consult ID: long term Abx recs:    If ARSALAN done and neg, rx x 2 weeks, through 6/12  If ARSALAN not done, rx x 4 weeks, through 6/26  If ARSALAN with vegetation, rx x 6 weeks, through 7/10     MRSA bacteremia  - check procalcitonin - .13  - cont daptomycin  - blood and wound cultures NG x2 days  - ECHO without valve vegeation  -

## 2019-06-03 NOTE — PROGRESS NOTES
Patient is A&O x3.  RA, sat 98%. No complaints of SOB. Medicated for c/o left facial pain as needed. Respirations appear to easy and unlabored. Lungs clear. Respirations easy with no complaints of cough. No complaints of nausea/vomiting/diarrhea. Up ad elizabeth to the bathroom/BSC as needed. Left FA PIV intact and flushed. Tolerating regular diet. Plan of care and safety measures reviewed with the patient. Call light in reach. Will continue to monitor.   Electronically signed by Cirstin Cobos RN on 6/2/2019 at 11:39 PM

## 2019-06-04 VITALS
DIASTOLIC BLOOD PRESSURE: 64 MMHG | HEART RATE: 114 BPM | TEMPERATURE: 98 F | RESPIRATION RATE: 16 BRPM | BODY MASS INDEX: 20.54 KG/M2 | OXYGEN SATURATION: 98 % | HEIGHT: 72 IN | SYSTOLIC BLOOD PRESSURE: 113 MMHG | WEIGHT: 151.68 LBS

## 2019-06-04 PROBLEM — R78.81 MRSA BACTEREMIA: Status: RESOLVED | Noted: 2019-06-01 | Resolved: 2019-06-04

## 2019-06-04 PROBLEM — L03.211 FACIAL CELLULITIS: Status: RESOLVED | Noted: 2019-05-30 | Resolved: 2019-06-04

## 2019-06-04 PROBLEM — B95.62 MRSA BACTEREMIA: Status: RESOLVED | Noted: 2019-06-01 | Resolved: 2019-06-04

## 2019-06-04 LAB
BLOOD CULTURE, ROUTINE: NORMAL
CULTURE, BLOOD 2: NORMAL

## 2019-06-04 PROCEDURE — 2580000003 HC RX 258

## 2019-06-04 PROCEDURE — 6370000000 HC RX 637 (ALT 250 FOR IP)

## 2019-06-04 PROCEDURE — 6370000000 HC RX 637 (ALT 250 FOR IP): Performed by: FAMILY MEDICINE

## 2019-06-04 PROCEDURE — 2580000003 HC RX 258: Performed by: INTERNAL MEDICINE

## 2019-06-04 PROCEDURE — 6360000002 HC RX W HCPCS: Performed by: INTERNAL MEDICINE

## 2019-06-04 PROCEDURE — 94760 N-INVAS EAR/PLS OXIMETRY 1: CPT

## 2019-06-04 RX ORDER — LINEZOLID 600 MG/1
600 TABLET, FILM COATED ORAL 2 TIMES DAILY
Qty: 60 TABLET | Refills: 0 | Status: SHIPPED | OUTPATIENT
Start: 2019-06-04 | End: 2019-07-04

## 2019-06-04 RX ADMIN — TRAMADOL HYDROCHLORIDE 100 MG: 50 TABLET, FILM COATED ORAL at 09:21

## 2019-06-04 RX ADMIN — Medication 10 ML: at 09:16

## 2019-06-04 RX ADMIN — DAPTOMYCIN 465 MG: 500 INJECTION, POWDER, LYOPHILIZED, FOR SOLUTION INTRAVENOUS at 11:20

## 2019-06-04 ASSESSMENT — PAIN DESCRIPTION - LOCATION
LOCATION: FACE
LOCATION: FACE

## 2019-06-04 ASSESSMENT — PAIN DESCRIPTION - PROGRESSION
CLINICAL_PROGRESSION: NOT CHANGED

## 2019-06-04 ASSESSMENT — PAIN DESCRIPTION - FREQUENCY
FREQUENCY: CONTINUOUS
FREQUENCY: CONTINUOUS

## 2019-06-04 ASSESSMENT — PAIN DESCRIPTION - ONSET
ONSET: ON-GOING
ONSET: ON-GOING

## 2019-06-04 ASSESSMENT — PAIN DESCRIPTION - PAIN TYPE
TYPE: ACUTE PAIN
TYPE: ACUTE PAIN

## 2019-06-04 ASSESSMENT — PAIN DESCRIPTION - ORIENTATION
ORIENTATION: LEFT
ORIENTATION: LEFT

## 2019-06-04 ASSESSMENT — PAIN DESCRIPTION - DESCRIPTORS
DESCRIPTORS: ACHING
DESCRIPTORS: ACHING

## 2019-06-04 ASSESSMENT — PAIN SCALES - GENERAL
PAINLEVEL_OUTOF10: 3
PAINLEVEL_OUTOF10: 7

## 2019-06-04 NOTE — PROGRESS NOTES
Pt awake alert oriented sitting up in bed denies pain snack provided reminded of NPO status at midnight will continue to  monitor.

## 2019-06-04 NOTE — DISCHARGE SUMMARY
Hospital Medicine Discharge Summary    Patient ID: Stephen Perdomo      Patient's PCP: Gail Hernandez MD    Admit Date: 5/30/2019     Discharge Date:   6/4/2019    Admitting Physician: Jd Shipley MD     Discharge Physician: Antonia Olvera MD     Discharge Diagnoses: Active Hospital Problems    Diagnosis Date Noted    IVDU (intravenous drug user) [F19.90] 06/01/2019       The patient was seen and examined on day of discharge and this discharge summary is in conjunction with any daily progress note from day of discharge. Hospital Course: The patient is a 33 y.o. male with a H/O IVDA who presents to [facility] with facial cellulitis, was treated at [facility] with improvement but left AMA yesterday. When he left he used IV. He was supposed to get an ECHO to RO endocarditis as he had positive blood cultures for MRSA. He initially presented here one week ago with facial swelling and had an I&D and discharged on clinda. He then went to  but left as he didn't like his treatment. He was admitted on 5/24 at [facility] and was being seen by infectious disease due to positive blood cultures. Peggye Duverney has been receiving daptomycin, cefepime and Flagyl, last doses were the am of leaving. Per ID's last note he was to be treated with only daptomycin 400 mg daily. Of note, he was stared on suboxone at [facility], but states he wants to try and stop it now.      Facial cellulitis  - cont daptomycin  - consult ID: long term Abx recs:   Patient refused ARSALAN despite recommendation to get one  Discussed with ID, will discharge patient on PO Zyvox 600 mg BID x 4 weeks  Ideally patient should be on IV Abx for four weeks, however, the patient is refusing to go to a facility for 4 weeks and refuses to come receive outpatient infusions each day for the next month  Ordered for weekly CBC, CMP     MRSA bacteremia  - check procalcitonin - .13  - cont daptomycin  - blood and wound cultures NG x2 days  - ECHO without valve vegeation  - needs ARSALAN, but patient refused; discussed with him the consequences of not having the ARSALAN and how this affects length of treatment as well as not knowing with greater certainty the presence or absence of vegetations; he understands yet still refuses the procedure     Elevated LFTs -decreasing  - mild,    - chronic Hep C  - will check acute hepatitis panel and HCV quant: low     IVDA  - caution with pain control  - will give toradol   - COWS: tramadol prn withdrawals  - discussed continuing suboxone, he previously stated he wants to try to avoid taking it. He now however wants narcotics for his pain. I explained that I'm not going to give him anything other that tramadol or toradol as that his pain has been controlled up until today. His last use of IV heroin/fentanyl was the day of admission. Exam:     BP (!) 89/56   Pulse 108   Temp 97.9 °F (36.6 °C) (Oral)   Resp 18   Ht 6' (1.829 m)   Wt 151 lb 10.8 oz (68.8 kg)   SpO2 96%   BMI 20.57 kg/m²       General appearance:  No apparent distress, appears stated age and cooperative. HEENT:  Normal cephalic, atraumatic without obvious deformity. Pupils equal, round, and reactive to light. Extra ocular muscles intact. Conjunctivae/corneas clear. Neck: Supple, with full range of motion. No jugular venous distention. Trachea midline. Respiratory:  Normal respiratory effort. Clear to auscultation, bilaterally without Rales/Wheezes/Rhonchi. Cardiovascular:  Regular rate and rhythm with normal S1/S2 without murmurs, rubs or gallops. Abdomen: Soft, non-tender, non-distended with normal bowel sounds. Musculoskeletal:  No clubbing, cyanosis or edema bilaterally. Full range of motion without deformity. Skin: Skin color, texture, turgor normal.  No rashes or lesions. Neurologic:  Neurovascularly intact without any focal sensory/motor deficits.  Cranial nerves: II-XII intact, grossly non-focal.  Psychiatric:  Alert and oriented, thought content appropriate, normal insight  Capillary Refill: Brisk,< 3 seconds   Peripheral Pulses: +2 palpable, equal bilaterally       Labs: For convenience and continuity at follow-up the following most recent labs are provided:      CBC:    Lab Results   Component Value Date    WBC 9.8 05/31/2019    HGB 12.3 05/31/2019    HCT 36.4 05/31/2019     05/31/2019       Renal:    Lab Results   Component Value Date     05/31/2019    K 4.7 05/31/2019    K 3.7 05/30/2019     05/31/2019    CO2 28 05/31/2019    BUN 9 05/31/2019    CREATININE 0.7 05/31/2019    CALCIUM 9.8 05/31/2019         Significant Diagnostic Studies    Radiology:   No orders to display          Consults:     IP CONSULT TO INFECTIOUS DISEASES    Disposition:  home     Condition at Discharge: Stable    Discharge Instructions/Follow-up:  PO Zyvox 600 mg BID x 1 month, will deliver via meds to beds for discharge, weekly CBC, CMP    Code Status:  Full Code     Activity: activity as tolerated    Diet: regular diet      Discharge Medications:     Current Discharge Medication List           Details   linezolid (ZYVOX) 600 MG tablet Take 1 tablet by mouth 2 times daily  Qty: 60 tablet, Refills: 0             Time Spent on discharge is more than 30 minutes in the examination, evaluation, counseling and review of medications and discharge plan. Signed:    Kusum Mason MD   6/4/2019      Thank you Owen Escobar MD for the opportunity to be involved in this patient's care. If you have any questions or concerns please feel free to contact me at [phone].

## 2019-06-04 NOTE — PROGRESS NOTES
Called [facility] at [phone] - cardiology notified that pt refused ARSALAN.

## 2019-06-04 NOTE — CARE COORDINATION
Returned call and left message for Faye Feng w/ [facility] [phone] she requested info on rehab plans at [place].      Electronically signed by Kevyn Guerra RN on 6/4/2019 at 3:39 PM

## 2019-06-04 NOTE — PLAN OF CARE
Problem: SAFETY  Goal: Free from accidental physical injury  Outcome: Ongoing     Problem: DAILY CARE  Goal: Daily care needs are met  Outcome: Ongoing     Problem: PAIN  Goal: Patient's pain/discomfort is manageable  Outcome: Ongoing     Problem: SKIN INTEGRITY  Goal: Skin integrity is maintained or improved  Outcome: Ongoing     Problem: KNOWLEDGE DEFICIT  Goal: Patient/S.O. demonstrates understanding of disease process, treatment plan, medications, and discharge instructions.   Outcome: Ongoing

## 2019-06-04 NOTE — PLAN OF CARE
Problem: SAFETY  Goal: Free from accidental physical injury  6/4/2019 1140 by Hans Lora RN  Outcome: Ongoing  Pt will be free from injury by using appropriate safety awareness and judgement. Pt will call for help when needed. RN will continue to do safety checks and ask if pt needs help to the bathroom in advance to prevent falls. Problem: SAFETY  Goal: Free from intentional harm  Outcome: Ongoing     Problem: DAILY CARE  Goal: Daily care needs are met  6/4/2019 1140 by Hans Lora RN  Outcome: Ongoing  Pt will continue to assist with personal hygiene and needs as tolerated. Pt will state when they needs help and RN/PCA will continue to help perform personal hygiene. RN will assess skin care needs. Problem: PAIN  Goal: Patient's pain/discomfort is manageable  6/4/2019 1140 by Hans Lora RN  Outcome: Ongoing  Will continue to use the pain scale (0-10) to measure pt's pain and monitor their needs. Will assess patients pain with assessments and interactions. Pt will notify RN of any changes in pain and where. Will continue to perform therapeutic comfort measures and give pain medications as prescribed/needed. Problem: SKIN INTEGRITY  Goal: Skin integrity is maintained or improved  6/4/2019 1140 by Hans Lora RN  Outcome: Ongoing  Pt will continue daily activities as tolerated and alert RN to any pain and skin changes. RN will continue to assess skin condition, note and changes, and take preventative measures to prevent skin breakdown such as movement, foam/silicone dressings on bony prominences, and making sure pt has adequate nutrition. Problem: KNOWLEDGE DEFICIT  Goal: Patient/S.O. demonstrates understanding of disease process, treatment plan, medications, and discharge instructions.   6/4/2019 1140 by Hans Lora RN  Outcome: Ongoing     Problem: Pain:  Goal: Pain level will decrease  Description  Pain level will decrease  Outcome: Ongoing  Will continue to use the pain scale (0-10) to measure pt's pain and monitor their needs. Will assess patients pain with assessments and interactions. Pt will notify RN of any changes in pain and where. Will continue to perform therapeutic comfort measures and give pain medications as prescribed/needed. Problem: Pain:  Goal: Control of acute pain  Description  Control of acute pain  Outcome: Ongoing  Will continue to use the pain scale (0-10) to measure pt's pain and monitor their needs. Will assess patients pain with assessments and interactions. Pt will notify RN of any changes in pain and where. Will continue to perform therapeutic comfort measures and give pain medications as prescribed/needed.           Problem: Pain:  Goal: Control of chronic pain  Description  Control of chronic pain  Outcome: Ongoing

## 2019-06-04 NOTE — CARE COORDINATION
SW met with patient regarding substance use treatment. Patient would like an appt with [facility] ([phone]) as he has a friend who receives Suboxone there. KLAUDIA assisted patient in scheduling with [facility]. Patient is able to go on-site any time Monday-Friday from 1-3pm. Clinical information faxed to [facility] as requested ([phone]). Patient had various questions regarding Medicaid and [employer]. Advised him to reach out to [address] [place] [address] and [employer] for specific questions. He already has their contact information. Does not need application as he has Active Medicaid. Grandmother to transport home.     Electronically signed by HALEIGH Degroot on 6/4/2019 at 2:57 PM

## 2020-06-23 ENCOUNTER — APPOINTMENT (OUTPATIENT)
Dept: GENERAL RADIOLOGY | Age: 30
DRG: 720 | End: 2020-06-23
Payer: MEDICARE

## 2020-06-23 ENCOUNTER — APPOINTMENT (OUTPATIENT)
Dept: CT IMAGING | Age: 30
DRG: 720 | End: 2020-06-23
Payer: MEDICARE

## 2020-06-23 ENCOUNTER — HOSPITAL ENCOUNTER (INPATIENT)
Age: 30
LOS: 1 days | Discharge: LEFT BEFORE DISCHARGE | DRG: 720 | End: 2020-06-24
Attending: STUDENT IN AN ORGANIZED HEALTH CARE EDUCATION/TRAINING PROGRAM | Admitting: INTERNAL MEDICINE
Payer: MEDICARE

## 2020-06-23 PROBLEM — A41.9 SEPSIS (HCC): Status: ACTIVE | Noted: 2020-06-23

## 2020-06-23 LAB
A/G RATIO: 1.2 (ref 1.1–2.2)
ALBUMIN SERPL-MCNC: 4.1 G/DL (ref 3.4–5)
ALP BLD-CCNC: 76 U/L (ref 40–129)
ALT SERPL-CCNC: 24 U/L (ref 10–40)
AMPHETAMINE SCREEN, URINE: POSITIVE
ANION GAP SERPL CALCULATED.3IONS-SCNC: 15 MMOL/L (ref 3–16)
AST SERPL-CCNC: 33 U/L (ref 15–37)
BARBITURATE SCREEN URINE: ABNORMAL
BASOPHILS ABSOLUTE: 0 K/UL (ref 0–0.2)
BASOPHILS RELATIVE PERCENT: 0.2 %
BENZODIAZEPINE SCREEN, URINE: ABNORMAL
BILIRUB SERPL-MCNC: 0.6 MG/DL (ref 0–1)
BUN BLDV-MCNC: 10 MG/DL (ref 7–20)
CALCIUM SERPL-MCNC: 9.2 MG/DL (ref 8.3–10.6)
CANNABINOID SCREEN URINE: ABNORMAL
CHLORIDE BLD-SCNC: 94 MMOL/L (ref 99–110)
CO2: 23 MMOL/L (ref 21–32)
COCAINE METABOLITE SCREEN URINE: ABNORMAL
CREAT SERPL-MCNC: 0.9 MG/DL (ref 0.9–1.3)
EKG ATRIAL RATE: 114 BPM
EKG DIAGNOSIS: NORMAL
EKG P AXIS: 79 DEGREES
EKG P-R INTERVAL: 116 MS
EKG Q-T INTERVAL: 352 MS
EKG QRS DURATION: 102 MS
EKG QTC CALCULATION (BAZETT): 485 MS
EKG R AXIS: 75 DEGREES
EKG T AXIS: 53 DEGREES
EKG VENTRICULAR RATE: 114 BPM
EOSINOPHILS ABSOLUTE: 0 K/UL (ref 0–0.6)
EOSINOPHILS RELATIVE PERCENT: 0.1 %
GFR AFRICAN AMERICAN: >60
GFR NON-AFRICAN AMERICAN: >60
GLOBULIN: 3.4 G/DL
GLUCOSE BLD-MCNC: 108 MG/DL (ref 70–99)
HCT VFR BLD CALC: 36.6 % (ref 40.5–52.5)
HEMOGLOBIN: 12 G/DL (ref 13.5–17.5)
LACTIC ACID, SEPSIS: 1.1 MMOL/L (ref 0.4–1.9)
LYMPHOCYTES ABSOLUTE: 0.8 K/UL (ref 1–5.1)
LYMPHOCYTES RELATIVE PERCENT: 4.5 %
Lab: ABNORMAL
MCH RBC QN AUTO: 29.4 PG (ref 26–34)
MCHC RBC AUTO-ENTMCNC: 32.9 G/DL (ref 31–36)
MCV RBC AUTO: 89.4 FL (ref 80–100)
METHADONE SCREEN, URINE: ABNORMAL
MONOCYTES ABSOLUTE: 1.1 K/UL (ref 0–1.3)
MONOCYTES RELATIVE PERCENT: 6.3 %
NEUTROPHILS ABSOLUTE: 16.1 K/UL (ref 1.7–7.7)
NEUTROPHILS RELATIVE PERCENT: 88.9 %
OPIATE SCREEN URINE: ABNORMAL
OXYCODONE URINE: ABNORMAL
PDW BLD-RTO: 13.5 % (ref 12.4–15.4)
PH UA: 7
PHENCYCLIDINE SCREEN URINE: ABNORMAL
PLATELET # BLD: 335 K/UL (ref 135–450)
PMV BLD AUTO: 6.9 FL (ref 5–10.5)
POTASSIUM REFLEX MAGNESIUM: 4.2 MMOL/L (ref 3.5–5.1)
PROPOXYPHENE SCREEN: ABNORMAL
RBC # BLD: 4.09 M/UL (ref 4.2–5.9)
SODIUM BLD-SCNC: 132 MMOL/L (ref 136–145)
TOTAL PROTEIN: 7.5 G/DL (ref 6.4–8.2)
WBC # BLD: 18 K/UL (ref 4–11)

## 2020-06-23 PROCEDURE — 80053 COMPREHEN METABOLIC PANEL: CPT

## 2020-06-23 PROCEDURE — 96365 THER/PROPH/DIAG IV INF INIT: CPT

## 2020-06-23 PROCEDURE — 93005 ELECTROCARDIOGRAM TRACING: CPT | Performed by: STUDENT IN AN ORGANIZED HEALTH CARE EDUCATION/TRAINING PROGRAM

## 2020-06-23 PROCEDURE — 83605 ASSAY OF LACTIC ACID: CPT

## 2020-06-23 PROCEDURE — 87186 SC STD MICRODIL/AGAR DIL: CPT

## 2020-06-23 PROCEDURE — 87150 DNA/RNA AMPLIFIED PROBE: CPT

## 2020-06-23 PROCEDURE — 73201 CT UPPER EXTREMITY W/DYE: CPT

## 2020-06-23 PROCEDURE — 6360000002 HC RX W HCPCS: Performed by: INTERNAL MEDICINE

## 2020-06-23 PROCEDURE — 93010 ELECTROCARDIOGRAM REPORT: CPT | Performed by: INTERNAL MEDICINE

## 2020-06-23 PROCEDURE — 6370000000 HC RX 637 (ALT 250 FOR IP): Performed by: INTERNAL MEDICINE

## 2020-06-23 PROCEDURE — 94760 N-INVAS EAR/PLS OXIMETRY 1: CPT

## 2020-06-23 PROCEDURE — 6360000004 HC RX CONTRAST MEDICATION: Performed by: STUDENT IN AN ORGANIZED HEALTH CARE EDUCATION/TRAINING PROGRAM

## 2020-06-23 PROCEDURE — 2580000003 HC RX 258: Performed by: INTERNAL MEDICINE

## 2020-06-23 PROCEDURE — 80307 DRUG TEST PRSMV CHEM ANLYZR: CPT

## 2020-06-23 PROCEDURE — 85025 COMPLETE CBC W/AUTO DIFF WBC: CPT

## 2020-06-23 PROCEDURE — 1200000000 HC SEMI PRIVATE

## 2020-06-23 PROCEDURE — 2580000003 HC RX 258: Performed by: STUDENT IN AN ORGANIZED HEALTH CARE EDUCATION/TRAINING PROGRAM

## 2020-06-23 PROCEDURE — 99285 EMERGENCY DEPT VISIT HI MDM: CPT

## 2020-06-23 PROCEDURE — 71045 X-RAY EXAM CHEST 1 VIEW: CPT

## 2020-06-23 PROCEDURE — 87040 BLOOD CULTURE FOR BACTERIA: CPT

## 2020-06-23 PROCEDURE — 6360000002 HC RX W HCPCS: Performed by: STUDENT IN AN ORGANIZED HEALTH CARE EDUCATION/TRAINING PROGRAM

## 2020-06-23 RX ORDER — MORPHINE SULFATE 2 MG/ML
2 INJECTION, SOLUTION INTRAMUSCULAR; INTRAVENOUS EVERY 4 HOURS PRN
Status: DISCONTINUED | OUTPATIENT
Start: 2020-06-23 | End: 2020-06-24 | Stop reason: HOSPADM

## 2020-06-23 RX ORDER — SODIUM CHLORIDE 0.9 % (FLUSH) 0.9 %
10 SYRINGE (ML) INJECTION PRN
Status: DISCONTINUED | OUTPATIENT
Start: 2020-06-23 | End: 2020-06-24 | Stop reason: HOSPADM

## 2020-06-23 RX ORDER — ACETAMINOPHEN 500 MG
1000 TABLET ORAL EVERY 8 HOURS SCHEDULED
Status: DISCONTINUED | OUTPATIENT
Start: 2020-06-23 | End: 2020-06-24 | Stop reason: HOSPADM

## 2020-06-23 RX ORDER — SODIUM CHLORIDE 0.9 % (FLUSH) 0.9 %
10 SYRINGE (ML) INJECTION EVERY 12 HOURS SCHEDULED
Status: DISCONTINUED | OUTPATIENT
Start: 2020-06-23 | End: 2020-06-24 | Stop reason: HOSPADM

## 2020-06-23 RX ORDER — ACETAMINOPHEN 650 MG/1
650 SUPPOSITORY RECTAL EVERY 6 HOURS PRN
Status: DISCONTINUED | OUTPATIENT
Start: 2020-06-23 | End: 2020-06-23

## 2020-06-23 RX ORDER — KETOROLAC TROMETHAMINE 30 MG/ML
30 INJECTION, SOLUTION INTRAMUSCULAR; INTRAVENOUS EVERY 6 HOURS PRN
Status: DISCONTINUED | OUTPATIENT
Start: 2020-06-23 | End: 2020-06-24 | Stop reason: HOSPADM

## 2020-06-23 RX ORDER — SODIUM CHLORIDE 9 MG/ML
INJECTION, SOLUTION INTRAVENOUS CONTINUOUS
Status: DISCONTINUED | OUTPATIENT
Start: 2020-06-23 | End: 2020-06-24 | Stop reason: HOSPADM

## 2020-06-23 RX ORDER — 0.9 % SODIUM CHLORIDE 0.9 %
30 INTRAVENOUS SOLUTION INTRAVENOUS ONCE
Status: COMPLETED | OUTPATIENT
Start: 2020-06-23 | End: 2020-06-23

## 2020-06-23 RX ORDER — ACETAMINOPHEN 325 MG/1
650 TABLET ORAL EVERY 6 HOURS PRN
Status: DISCONTINUED | OUTPATIENT
Start: 2020-06-23 | End: 2020-06-23

## 2020-06-23 RX ADMIN — SODIUM CHLORIDE 2097 ML: 9 INJECTION, SOLUTION INTRAVENOUS at 09:11

## 2020-06-23 RX ADMIN — ACETAMINOPHEN 1000 MG: 500 TABLET, FILM COATED ORAL at 20:34

## 2020-06-23 RX ADMIN — SODIUM CHLORIDE: 9 INJECTION, SOLUTION INTRAVENOUS at 13:03

## 2020-06-23 RX ADMIN — MORPHINE SULFATE 2 MG: 2 INJECTION, SOLUTION INTRAMUSCULAR; INTRAVENOUS at 14:12

## 2020-06-23 RX ADMIN — MORPHINE SULFATE 2 MG: 2 INJECTION, SOLUTION INTRAMUSCULAR; INTRAVENOUS at 18:31

## 2020-06-23 RX ADMIN — VANCOMYCIN HYDROCHLORIDE 1000 MG: 1 INJECTION, POWDER, LYOPHILIZED, FOR SOLUTION INTRAVENOUS at 11:56

## 2020-06-23 RX ADMIN — SODIUM CHLORIDE 3 G: 900 INJECTION INTRAVENOUS at 11:06

## 2020-06-23 RX ADMIN — KETOROLAC TROMETHAMINE 30 MG: 30 INJECTION, SOLUTION INTRAMUSCULAR at 20:34

## 2020-06-23 RX ADMIN — PIPERACILLIN AND TAZOBACTAM 3.38 G: 3; .375 INJECTION, POWDER, LYOPHILIZED, FOR SOLUTION INTRAVENOUS at 21:46

## 2020-06-23 RX ADMIN — PIPERACILLIN AND TAZOBACTAM 3.38 G: 3; .375 INJECTION, POWDER, LYOPHILIZED, FOR SOLUTION INTRAVENOUS at 14:12

## 2020-06-23 RX ADMIN — Medication 10 ML: at 20:34

## 2020-06-23 RX ADMIN — IOPAMIDOL 75 ML: 755 INJECTION, SOLUTION INTRAVENOUS at 09:39

## 2020-06-23 RX ADMIN — VANCOMYCIN HYDROCHLORIDE 1000 MG: 1 INJECTION, POWDER, LYOPHILIZED, FOR SOLUTION INTRAVENOUS at 19:56

## 2020-06-23 RX ADMIN — ACETAMINOPHEN 1000 MG: 500 TABLET, FILM COATED ORAL at 14:12

## 2020-06-23 ASSESSMENT — PAIN DESCRIPTION - ONSET
ONSET: ON-GOING

## 2020-06-23 ASSESSMENT — PAIN SCALES - GENERAL
PAINLEVEL_OUTOF10: 10
PAINLEVEL_OUTOF10: 8
PAINLEVEL_OUTOF10: 7
PAINLEVEL_OUTOF10: 6

## 2020-06-23 ASSESSMENT — PAIN DESCRIPTION - FREQUENCY
FREQUENCY: CONTINUOUS

## 2020-06-23 ASSESSMENT — PAIN DESCRIPTION - PROGRESSION
CLINICAL_PROGRESSION: NOT CHANGED

## 2020-06-23 ASSESSMENT — PAIN DESCRIPTION - DESCRIPTORS
DESCRIPTORS: ACHING;BURNING
DESCRIPTORS: ACHING;BURNING
DESCRIPTORS: ACHING;BURNING;SHARP

## 2020-06-23 ASSESSMENT — PAIN - FUNCTIONAL ASSESSMENT
PAIN_FUNCTIONAL_ASSESSMENT: PREVENTS OR INTERFERES SOME ACTIVE ACTIVITIES AND ADLS

## 2020-06-23 ASSESSMENT — PAIN DESCRIPTION - PAIN TYPE
TYPE: ACUTE PAIN

## 2020-06-23 ASSESSMENT — PAIN DESCRIPTION - ORIENTATION
ORIENTATION: RIGHT

## 2020-06-23 ASSESSMENT — PAIN DESCRIPTION - LOCATION
LOCATION: ARM
LOCATION: ARM

## 2020-06-23 NOTE — DISCHARGE INSTR - COC
Continuity of Care Form    Patient Name: Beatrice Curran   :  1990  MRN:  [id number]    Admit date:  2020  Discharge date:  ***    Code Status Order: Full Code   Advance Directives:   Advance Care Flowsheet Documentation     Date/Time Healthcare Directive Type of Healthcare Directive Copy in [address] Agent's Name Healthcare Agent's Phone Number    [phone]  No, patient does not have an advance directive for healthcare treatment -- -- -- -- --          Admitting Physician:  Pat Mercer MD  PCP: Morena Plunkett MD    Discharging Nurse: [facility] Unit/Room#: D4A-2832/7576-90  Discharging Unit Phone Number: ***    Emergency Contact:   Extended Emergency Contact Information  Primary Emergency Contact: Herb Cullen  Address: [address] [place] [address] Phone: [phone]  Relation: Parent  Secondary Emergency Contact: Hollie Jordan [facility] Phone: [phone]  Mobile Phone: [phone]  Relation: Grandparent    Past Surgical History:  Past Surgical History:   Procedure Laterality Date    FRACTURE SURGERY      Right wrist, L collar bone       Immunization History: There is no immunization history on file for this patient.     Active Problems:  Patient Active Problem List   Diagnosis Code    IVDU (intravenous drug user) F19.90    Sepsis ([facility] [address] A41.9       Isolation/Infection:   Isolation          No Isolation        Patient Infection Status     None to display          Nurse Assessment:  Last Vital Signs: /61   Pulse 99   Temp 98.7 °F (37.1 °C) (Oral)   Resp 16   Ht 6' 1\" (1.854 m)   Wt 153 lb (69.4 kg)   SpO2 99%   BMI 20.19 kg/m²     Last documented pain score (0-10 scale):    Last Weight:   Wt Readings from Last 1 Encounters:   20 153 lb (69.4 kg)     Mental Status:  {IP PT MENTAL STATUS:}    IV Access:  { AARON IV ACCESS:443419043}    Nursing Mobility/ADLs:  Walking   {P Facility/ Agency   · Name:   · Address:  · Phone:  · Fax:    Dialysis Facility (if applicable)   · Name:  · Address:  · Dialysis Schedule:  · Phone:  · Fax:    / signature: {Esignature:850933596}    PHYSICIAN SECTION    Prognosis: {Prognosis:6440789895}    Condition at Discharge: Vera Miles Patient Condition:878380208}    Rehab Potential (if transferring to Rehab): {Prognosis:5013373398}    Recommended Labs or Other Treatments After Discharge: ***    Physician Certification: I certify the above information and transfer of Ghazal Tang  is necessary for the continuing treatment of the diagnosis listed and that he requires {Admit to Appropriate Level of Care:06212} for {GREATER/LESS:536547047} 30 days.      Update Admission H&P: {CHP DME Changes in PXKDM:973900683}    PHYSICIAN SIGNATURE:  {Esignature:703863707}

## 2020-06-23 NOTE — H&P
(Oral)   Resp (!) 33   Wt 154 lb 1.6 oz (69.9 kg)   SpO2 98%   BMI 20.90 kg/m²     General appearance: No apparent distress appears stated age and cooperative, numerous facial lesions  HEENT Normal cephalic, atraumatic without obvious deformity. Pupils equal, round, and reactive to light. Extra ocular muscles intact. Conjunctivae/corneas clear. Neck: Supple, No jugular venous distention/bruits. Trachea midline without thyromegaly or adenopathy with full range of motion. Lungs: Clear to auscultation, bilaterally without Rales/Wheezes/Rhonchi with good respiratory effort. Heart: Regular rate and rhythm with Normal S1/S2 without murmurs, rubs or gallops, point of maximum impulse non-displaced  Abdomen: Soft, non-tender or non-distended without rigidity or guarding and positive bowel sounds all four quadrants. Extremities: RUE swollen, red  Skin: erythematous RUE  Neurologic: Alert and oriented X 3, neurovascularly intact with sensory/motor intact upper extremities/lower extremities, bilaterally. Cranial nerves: II-XII intact, grossly non-focal.  Mental status: Alert, oriented, thought content appropriate. Capillary Refill: Acceptable  < 3 seconds  Peripheral Pulses: +3 Easily felt, not easily obliterated with pressure      CXR:  I have reviewed the CXR with the following interpretation: no acute process    CT scan of the right arm with swelling and edema, no drainable abscess      CBC   Recent Labs     06/23/20  0843   WBC 18.0*   HGB 12.0*   HCT 36.6*         RENAL  Recent Labs     06/23/20  0843   *   K 4.2   CL 94*   CO2 23   BUN 10   CREATININE 0.9     LFT'S  Recent Labs     06/23/20  0843   AST 33   ALT 24   BILITOT 0.6   ALKPHOS 76     COAG  No results for input(s): INR in the last 72 hours. CARDIAC ENZYMES  No results for input(s): CKTOTAL, CKMB, CKMBINDEX, TROPONINI in the last 72 hours.     U/A:    Lab Results   Component Value Date    COLORU Yellow 10/07/2017    WBCUA None seen

## 2020-06-23 NOTE — ED NOTES
Pt continues to move around in bed, has been taking tele monitors wires off, pt has been told several times that it is for his well being that we monitor his heart rate and other vital signs, pt is still continuing to remove face mask, and cusses at staff when told to put it back on, pt is very fidgety in the bed, pt has been rubbing the wound on his right arm, and yelling out the door that his arm is red, pt was told many times that we are aware of the redness and that it is hot to touch,      Tanner Goldsmith RN  06/23/20 2323

## 2020-06-23 NOTE — CARE COORDINATION
Tried contacting pt in room, no answer. Pt was given drug rehab info on [facility] in pt program June 2019. Will continue to try to contact pt re dc needs.   Electronically signed by SHARMAINE Martin on 6/23/2020 at 3:30 PM

## 2020-06-23 NOTE — ED PROVIDER NOTES
Cans of beer per week     Comment: occasional use    Drug use: Yes     Types: IV, Opiates      Comment: 0.5 gram a day     Sexual activity: Not on file   Lifestyle    Physical activity     Days per week: Not on file     Minutes per session: Not on file    Stress: Not on file   Relationships    Social connections     Talks on phone: Not on file     Gets together: Not on file     Attends L[employer] day service: Not on file     Active member of club or organization: Not on file     Attends meetings of clubs or organizations: Not on file     Relationship status: Not on file    Intimate partner violence     Fear of current or ex partner: Not on file     Emotionally abused: Not on file     Physically abused: Not on file     Forced sexual activity: Not on file   Other Topics Concern    Not on file   Social History Narrative    Not on file        Review of Systems   10 total systems reviewed and found to be negative unless otherwise noted in HPI     Physical Exam   BP (!) 107/50   Pulse 115   Temp 100.9 °F (38.3 °C) (Oral)   Resp (!) 33   Wt 154 lb 1.6 oz (69.9 kg)   SpO2 98%   BMI 20.90 kg/m²      CONSTITUTIONAL: Well appearing, in no acute distress   HEAD: atraumatic, normocephalic   EYES: PERRL, No injection, discharge or scleral icterus. ENT: Moist mucous membranes. NECK: Normal ROM, NO LAD   CARDIOVASCULAR: Regular rate and rhythm. No murmurs or gallop. PULMONARY/CHEST: Airway patent. No retractions. Breath sounds clear with good air entry bilaterally. ABDOMEN: Soft, Non-distended and non-tender, without guarding or rebound. SKIN: Acyanotic, warm, dry   MUSCULOSKELETAL: But right forearm swelling, redness with puncture wounds  NEUROLOGICAL: Awake and oriented x 3. Pulses intact. Grossly nonfocal   Nursing note and vitals reviewed.      ED Course & Medical Decision Making   Medications   vancomycin 1000 mg IVPB in 250 mL D5W addavial (has no administration in time range)   ampicillin-sulbactam (UNASYN) 3 g ivpb minibag (has no administration in time range)   0.9 % sodium chloride IV bolus 2,097 mL (2,097 mLs Intravenous New Bag 6/23/20 0911)   iopamidol (ISOVUE-370) 76 % injection 75 mL (75 mLs Intravenous Given 6/23/20 0939)      Labs Reviewed   CBC WITH AUTO DIFFERENTIAL - Abnormal; Notable for the following components:       Result Value    WBC 18.0 (*)     RBC 4.09 (*)     Hemoglobin 12.0 (*)     Hematocrit 36.6 (*)     Neutrophils Absolute 16.1 (*)     Lymphocytes Absolute 0.8 (*)     All other components within normal limits    Narrative:     Performed at:  [address] [employer] [address]   Phone [phone]   COMPREHENSIVE METABOLIC PANEL W/ REFLEX TO MG FOR LOW K - Abnormal; Notable for the following components:    Sodium 132 (*)     Chloride 94 (*)     Glucose 108 (*)     All other components within normal limits    Narrative:     Performed at:  [address] [employer] [place] [address]   Phone [phone]   CULTURE, BLOOD 1   CULTURE, BLOOD 2   LACTATE, SEPSIS    Narrative:     Performed at:  [place] [address] [address] [facility] [employer] [place] [address]   Phone [phone]   LACTATE, SEPSIS   URINE DRUG SCREEN   ETHANOL      CT RADIUS ULNA RIGHT W CONTRAST   Preliminary Result   Prominent subcutaneous edema noted to the forearm, especially to the   posterior proximal to mid forearm concerning for cellulitis. No focal fluid   collection identified to suggest abscess formation. No soft tissue gas or   radiopaque foreign bodies. No acute bony abnormalities. No CT evidence for osteomyelitis. XR CHEST PORTABLE   Final Result   Unremarkable chest.            EKG INTERPRETATION:  EKG by my preliminary interpretation shows sinus tach with a rate of 114, normal axis, normal intervals, with no ST changes indicative of ischemia at this time.     PROCEDURES: Procedures    ASSESSMENT AND PLAN:  Haider Chauhan is a 30 y.o. male history of IV drug use, hep C who presents this morning with complaint of right upper extremity swelling, and warmth to touch. Patient is tachycardic, diaphoretic, febrile, redness, swelling and warmth to touch right upper extremity with some puncture wounds or injection site marks. Given his presentation, I was concerned for sepsis secondary to soft tissue infection. Did obtain labs which show significant leukocytosis of 18, but normal lactate. A CT scan was obtained to evaluate for abscess and showed no abscess or fluid collection. Keira Peterson his presentation and findings I believe that his symptoms are secondary to cellulitis from IV drug use. Septic protocol was initiated cultures pending patient started on broad-spectrum antibiotics Vanco and Unasyn. I am recommending, that he be admitted for sepsis due to cellulitis with IV antibiotic treatment. ClINICAL IMPRESSION:  1. Cellulitis of right upper extremity    2. Septicemia ([address]    3. IV drug abuse [facility])        DISPOSITION    Hospitalist admit   -Findings and recommendations explained to patient. He expressed understanding and agreed with the plan.   Sharmila Angeles MD (electronically signed)  6/23/2020  _________________________________________________________________________________________  Amount and/or Complexity of Data Reviewed:  Clinical lab tests: ordered and reviewed   Tests in the radiology section of CPT®: ordered and reviewed   Tests in the medicine section of CPT®: ordered and reviewed   Decide to obtain previous medical records or to obtain history from someone other than the patient: no  Obtain history from someone other than the patient: no  Review and summarize past medical records:yes  I looked up the patient in our electronic medical record:yes  Discuss the patient with other providers:yes  Independent visualization of images, tracings, or specimens:yes  Risk of

## 2020-06-23 NOTE — ED NOTES
Bed: A-16  Expected date: 6/23/20  Expected time:   Means of arrival: [facility]  Comments:  Agitation      Bethany Esparza RN  06/23/20 2609

## 2020-06-23 NOTE — PROGRESS NOTES
Pt arrived to room 4105. Alert and oriented, VS taken and stable. Skin assessment and head to toe completed. Scabs and needle marks noted on BUE and face. Pt c/o of generalized, and rt arm pain rated 10. Pt expressing anxious, uncompliant, agitated behavior. States \"Im not staying here long, when will I see the doctor? \" Pt oriented to room, call light, and staff. Gave menu to order lunch. Will continue to monitor.  Electronically signed by Leonardo Richmond RN on 6/23/2020 at 12:30 PM

## 2020-06-24 VITALS
HEIGHT: 73 IN | DIASTOLIC BLOOD PRESSURE: 62 MMHG | HEART RATE: 72 BPM | WEIGHT: 153.88 LBS | BODY MASS INDEX: 20.39 KG/M2 | TEMPERATURE: 98 F | RESPIRATION RATE: 16 BRPM | SYSTOLIC BLOOD PRESSURE: 115 MMHG | OXYGEN SATURATION: 98 %

## 2020-06-24 LAB — REPORT: NORMAL

## 2020-06-24 PROCEDURE — 2580000003 HC RX 258: Performed by: INTERNAL MEDICINE

## 2020-06-24 PROCEDURE — 6360000002 HC RX W HCPCS: Performed by: INTERNAL MEDICINE

## 2020-06-24 PROCEDURE — 94760 N-INVAS EAR/PLS OXIMETRY 1: CPT

## 2020-06-24 RX ORDER — SULFAMETHOXAZOLE AND TRIMETHOPRIM 800; 160 MG/1; MG/1
1 TABLET ORAL 2 TIMES DAILY
Qty: 20 TABLET | Refills: 0 | Status: SHIPPED | OUTPATIENT
Start: 2020-06-24 | End: 2020-07-04

## 2020-06-24 RX ADMIN — SODIUM CHLORIDE: 9 INJECTION, SOLUTION INTRAVENOUS at 06:23

## 2020-06-24 RX ADMIN — PIPERACILLIN AND TAZOBACTAM 3.38 G: 3; .375 INJECTION, POWDER, LYOPHILIZED, FOR SOLUTION INTRAVENOUS at 06:19

## 2020-06-24 RX ADMIN — VANCOMYCIN HYDROCHLORIDE 1000 MG: 1 INJECTION, POWDER, LYOPHILIZED, FOR SOLUTION INTRAVENOUS at 04:29

## 2020-06-24 RX ADMIN — MORPHINE SULFATE 2 MG: 2 INJECTION, SOLUTION INTRAMUSCULAR; INTRAVENOUS at 06:24

## 2020-06-24 RX ADMIN — MORPHINE SULFATE 2 MG: 2 INJECTION, SOLUTION INTRAMUSCULAR; INTRAVENOUS at 00:23

## 2020-06-24 RX ADMIN — MORPHINE SULFATE 2 MG: 2 INJECTION, SOLUTION INTRAMUSCULAR; INTRAVENOUS at 10:31

## 2020-06-24 ASSESSMENT — PAIN DESCRIPTION - PAIN TYPE
TYPE: ACUTE PAIN

## 2020-06-24 ASSESSMENT — PAIN DESCRIPTION - PROGRESSION
CLINICAL_PROGRESSION: NOT CHANGED

## 2020-06-24 ASSESSMENT — PAIN SCALES - GENERAL
PAINLEVEL_OUTOF10: 0
PAINLEVEL_OUTOF10: 9
PAINLEVEL_OUTOF10: 9
PAINLEVEL_OUTOF10: 2
PAINLEVEL_OUTOF10: 7

## 2020-06-24 ASSESSMENT — PAIN DESCRIPTION - LOCATION
LOCATION: ARM

## 2020-06-24 ASSESSMENT — PAIN DESCRIPTION - ORIENTATION
ORIENTATION: RIGHT

## 2020-06-24 ASSESSMENT — PAIN DESCRIPTION - DESCRIPTORS
DESCRIPTORS: ACHING;BURNING

## 2020-06-24 ASSESSMENT — PAIN - FUNCTIONAL ASSESSMENT
PAIN_FUNCTIONAL_ASSESSMENT: PREVENTS OR INTERFERES SOME ACTIVE ACTIVITIES AND ADLS

## 2020-06-24 ASSESSMENT — PAIN DESCRIPTION - ONSET
ONSET: ON-GOING
ONSET: AWAKENED FROM SLEEP
ONSET: ON-GOING

## 2020-06-24 ASSESSMENT — PAIN DESCRIPTION - FREQUENCY
FREQUENCY: CONTINUOUS

## 2020-06-24 NOTE — PROGRESS NOTES
Hospitalist Progress Note      PCP: Elisabeth Greer MD    Date of Admission: 6/23/2020    Chief Complaint: R arm pain    Hospital Course: The patient is a 30 y.o. male who presents to [facility] with right arm pain. Patient states it started to redden about a week ago and 2 days ago it got really bad. Admits to shooting up with meth and fentanyl. As a history of HepC. Last use was this morning. In ED patient noted to have fever, WBC and right arm cellulitis. He was given IVF, IV abx and will be admitted to the hospital for further intervention and care.      Patient has already stated that he might leave AMA. Subjective: Patient seen and examined. Patient states that he is going to leave AMA today. Patient states that he is now going through withdrawal and is no longer wanted be in the hospital.  We went over the consequences of leaving the hospital [address] Patient understands the risk versus benefit of leaving and staying. I did agree to prescribe the patient oral antibiotics at discharge. Antibiotic sent to his pharmacy.       Medications:  Reviewed    Infusion Medications    sodium chloride 150 mL/hr at 06/24/20 8226     Scheduled Medications    sodium chloride flush  10 mL Intravenous 2 times per day    enoxaparin  40 mg Subcutaneous Daily    piperacillin-tazobactam  3.375 g Intravenous Q8H    vancomycin  1,000 mg Intravenous Q8H    vancomycin (VANCOCIN) intermittent dosing (placeholder)   Other RX Placeholder    acetaminophen  1,000 mg Oral 3 times per day     PRN Meds: sodium chloride flush, morphine, ketorolac      Intake/Output Summary (Last 24 hours) at 6/24/2020 0820  Last data filed at 6/24/2020 7614  Gross per 24 hour   Intake 2560 ml   Output 800 ml   Net 1760 ml       Physical Exam Performed:    /62   Pulse 72   Temp 98 °F (36.7 °C) (Oral)   Resp 16   Ht 6' 1\" (1.854 m)   Wt 153 lb 14.1 oz (69.8 kg)   SpO2 98%   BMI 20.30 kg/m²     General appearance: No apparent distress, appears stated age and cooperative. HEENT: Pupils equal, round, and reactive to light. Conjunctivae/corneas clear. Neck: Supple, with full range of motion. No jugular venous distention. Trachea midline. Respiratory:  Normal respiratory effort. Clear to auscultation, bilaterally without Rales/Wheezes/Rhonchi. Cardiovascular: Regular rate and rhythm with normal S1/S2 without murmurs, rubs or gallops. Abdomen: Soft, non-tender, non-distended with normal bowel sounds. Extremities: RUE swollen, red  Skin: erythematous RUE  Neurologic:  Neurovascularly intact without any focal sensory/motor deficits. Cranial nerves: II-XII intact, grossly non-focal.  Psychiatric: Alert and oriented, thought content appropriate, normal insight  Capillary Refill: Brisk,< 3 seconds   Peripheral Pulses: +2 palpable, equal bilaterally       Labs:   Recent Labs     06/23/20  0843   WBC 18.0*   HGB 12.0*   HCT 36.6*        Recent Labs     06/23/20  0843   *   K 4.2   CL 94*   CO2 23   BUN 10   CREATININE 0.9   CALCIUM 9.2     Recent Labs     06/23/20  0843   AST 33   ALT 24   BILITOT 0.6   ALKPHOS 76     No results for input(s): INR in the last 72 hours. No results for input(s): Lyle Bending in the last 72 hours. Urinalysis:      Lab Results   Component Value Date    NITRU Negative 10/07/2017    WBCUA None seen 10/07/2017    BACTERIA Rare 10/07/2017    RBCUA 0-2 10/07/2017    BLOODU Negative 10/07/2017    SPECGRAV 1.015 10/07/2017    GLUCOSEU Negative 10/07/2017       Radiology:  CT RADIUS ULNA RIGHT W CONTRAST   Final Result   Prominent subcutaneous edema noted to the forearm, especially to the   posterior proximal to mid forearm concerning for cellulitis. No focal fluid   collection identified to suggest abscess formation. No soft tissue gas or   radiopaque foreign bodies. No acute bony abnormalities. No CT evidence for osteomyelitis.          XR CHEST PORTABLE   Final

## 2020-06-24 NOTE — PLAN OF CARE
Problem: Discharge Planning:  Goal: Discharged to appropriate level of care  Description: Discharged to appropriate level of care  Outcome: Ongoing     Problem:  Body Temperature - Imbalanced:  Goal: Ability to maintain a body temperature in the normal range will improve  Description: Ability to maintain a body temperature in the normal range will improve  Outcome: Ongoing     Problem: Mobility - Impaired:  Goal: Mobility will improve to maximum level  Description: Mobility will improve to maximum level  Outcome: Ongoing     Problem: Pain:  Goal: Pain level will decrease  Description: Pain level will decrease  Outcome: Ongoing  Goal: Control of acute pain  Description: Control of acute pain  Outcome: Ongoing  Goal: Control of chronic pain  Description: Control of chronic pain  Outcome: Ongoing     Problem: Skin Integrity - Impaired:  Goal: Will show no infection signs and symptoms  Description: Will show no infection signs and symptoms  Outcome: Ongoing  Goal: Absence of new skin breakdown  Description: Absence of new skin breakdown  Outcome: Ongoing     Problem: Pain:  Goal: Pain level will decrease  Description: Pain level will decrease  Outcome: Ongoing  Goal: Control of acute pain  Description: Control of acute pain  Outcome: Ongoing  Goal: Control of chronic pain  Description: Control of chronic pain  Outcome: Ongoing

## 2020-06-24 NOTE — PLAN OF CARE
Problem: Discharge Planning:  Goal: Discharged to appropriate level of care  Description: Discharged to appropriate level of care  6/24/2020 1149 by Deirdre Ocampo RN  Outcome: Ongoing  6/24/2020 0439 by Leopoldo Lobstein, RN  Outcome: Ongoing     Problem:  Body Temperature - Imbalanced:  Goal: Ability to maintain a body temperature in the normal range will improve  Description: Ability to maintain a body temperature in the normal range will improve  6/24/2020 1149 by Deirdre Ocampo RN  Outcome: Ongoing  6/24/2020 0439 by Leopoldo Lobstein, RN  Outcome: Ongoing     Problem: Mobility - Impaired:  Goal: Mobility will improve to maximum level  Description: Mobility will improve to maximum level  6/24/2020 1149 by Deirdre Ocampo RN  Outcome: Ongoing  6/24/2020 0439 by Leopoldo Lobstein, RN  Outcome: Ongoing     Problem: Pain:  Goal: Pain level will decrease  Description: Pain level will decrease  6/24/2020 1149 by Deirdre Ocampo RN  Outcome: Ongoing  6/24/2020 0439 by Leopoldo Lobstein, RN  Outcome: Ongoing  Goal: Control of acute pain  Description: Control of acute pain  6/24/2020 1149 by Deirdre Ocampo RN  Outcome: Ongoing  6/24/2020 0439 by Leopoldo Lobstein, RN  Outcome: Ongoing  Goal: Control of chronic pain  Description: Control of chronic pain  6/24/2020 1149 by Deirdre Ocampo RN  Outcome: Ongoing  6/24/2020 0439 by Leopoldo Lobstein, RN  Outcome: Ongoing     Problem: Skin Integrity - Impaired:  Goal: Will show no infection signs and symptoms  Description: Will show no infection signs and symptoms  6/24/2020 1149 by Deirdre Ocampo RN  Outcome: Ongoing  6/24/2020 0439 by Leopoldo Lobstein, RN  Outcome: Ongoing  Goal: Absence of new skin breakdown  Description: Absence of new skin breakdown  6/24/2020 1149 by Deirdre Ocampo RN  Outcome: Ongoing  6/24/2020 0439 by Leopoldo Lobstein, RN  Outcome: Ongoing     Problem: Pain:  Goal: Pain level will decrease  Description: Pain level will decrease  6/24/2020 1149 by Deirdre Ocampo RN  Outcome: Ongoing  6/24/2020 0439 by Nita Browning RN  Outcome: Ongoing  Goal: Control of acute pain  Description: Control of acute pain  6/24/2020 1149 by Leonardo Richmond RN  Outcome: Ongoing  6/24/2020 0439 by Nita Browning RN  Outcome: Ongoing  Goal: Control of chronic pain  Description: Control of chronic pain  6/24/2020 1149 by Leonardo Richmond RN  Outcome: Ongoing  6/24/2020 0439 by Nita Browning RN  Outcome: Ongoing

## 2020-06-26 LAB
BLOOD CULTURE, ROUTINE: ABNORMAL
BLOOD CULTURE, ROUTINE: ABNORMAL
ORGANISM: ABNORMAL
ORGANISM: ABNORMAL

## 2020-06-26 NOTE — RESULT ENCOUNTER NOTE
Patient's blood culture was positive. According to chart review the patient was admitted but left AGAINST MEDICAL ADVICE. He should be informed of positive blood culture, advised to continue to take the Bactrim that he was prescribed at discharge, and advised to come back to the emergency department if he has any fever or worsening of symptoms.

## 2020-06-27 LAB — CULTURE, BLOOD 2: NORMAL

## 2020-08-23 ENCOUNTER — HOSPITAL ENCOUNTER (EMERGENCY)
Age: 30
Discharge: LEFT BEFORE DISCHARGE | End: 2020-08-23
Attending: EMERGENCY MEDICINE
Payer: MEDICARE

## 2020-08-23 VITALS
HEIGHT: 73 IN | BODY MASS INDEX: 20.42 KG/M2 | SYSTOLIC BLOOD PRESSURE: 131 MMHG | WEIGHT: 154.1 LBS | RESPIRATION RATE: 24 BRPM | HEART RATE: 133 BPM | DIASTOLIC BLOOD PRESSURE: 88 MMHG | OXYGEN SATURATION: 96 % | TEMPERATURE: 100.3 F

## 2020-08-23 PROCEDURE — 4500000002 HC ER NO CHARGE

## 2020-08-23 ASSESSMENT — PAIN DESCRIPTION - LOCATION: LOCATION: ARM

## 2020-08-23 ASSESSMENT — PAIN DESCRIPTION - PROGRESSION: CLINICAL_PROGRESSION: GRADUALLY WORSENING

## 2020-08-23 ASSESSMENT — PAIN DESCRIPTION - ORIENTATION: ORIENTATION: RIGHT

## 2020-08-23 ASSESSMENT — PAIN SCALES - GENERAL
PAINLEVEL_OUTOF10: 8
PAINLEVEL_OUTOF10: 8

## 2020-08-23 ASSESSMENT — PAIN DESCRIPTION - FREQUENCY: FREQUENCY: CONTINUOUS

## 2020-08-23 ASSESSMENT — PAIN DESCRIPTION - ONSET: ONSET: ON-GOING

## 2020-08-23 ASSESSMENT — PAIN DESCRIPTION - DESCRIPTORS: DESCRIPTORS: ACHING

## 2020-08-23 ASSESSMENT — PAIN DESCRIPTION - PAIN TYPE: TYPE: ACUTE PAIN

## 2020-08-23 ASSESSMENT — PAIN - FUNCTIONAL ASSESSMENT: PAIN_FUNCTIONAL_ASSESSMENT: PREVENTS OR INTERFERES SOME ACTIVE ACTIVITIES AND ADLS

## 2020-08-23 NOTE — ED PROVIDER NOTES
Patient was not seen or evaluated by me. Jaziel Tyler is a 30 y.o. male who presented to the emergency department via police escort secondary to concern for abscesses. Per police they brought him after being called for altercation with family (his grandma). Police provided him with transportation here as requested by him and to provide grandma some space; he is not under arrest. He requested to come here for evaluation of abscess. After being triaged by RN but before being seen by me there was thunder heard outside. Patient reportedly came out stating he had to walk home and could not wait to be seen. RN staff report they notified patient of his fever and their concern for his health but despite this he did not want to stay. He was encouraged to return here at any time for further evaluation and he stated he planned to go to [facility] tomorrow.       Carlos Trevino MD  08/23/20 7211

## 2020-08-23 NOTE — ED TRIAGE NOTES
Patient to EMERGENCY DEPARTMENT ambulatory per [facility]- patient states not feeling well today- already a bad day- was arrested for disorderly conduct- \" tiff with family member\" states has abscess to right posterior forearm for a month and needs it fixed . Patient pacing and moving arms with constant sniffing of nose. States used \"heroin and [employer]" today. States I just want to go home.

## 2020-08-23 NOTE — ED NOTES
Patient states he wants to go home- hears thunder and has to walk home.      Leo Abrams, RN  08/23/20 7769

## 2020-10-28 ENCOUNTER — HOSPITAL ENCOUNTER (EMERGENCY)
Age: 30
Discharge: HOME | End: 2020-10-28
Attending: EMERGENCY MEDICINE
Payer: MEDICARE

## 2020-10-28 VITALS
BODY MASS INDEX: 21.56 KG/M2 | DIASTOLIC BLOOD PRESSURE: 96 MMHG | SYSTOLIC BLOOD PRESSURE: 136 MMHG | TEMPERATURE: 99.1 F | WEIGHT: 162.7 LBS | HEART RATE: 102 BPM | RESPIRATION RATE: 18 BRPM | HEIGHT: 73 IN | OXYGEN SATURATION: 100 %

## 2020-10-28 LAB
A/G RATIO: 1.1 (ref 1.1–2.2)
ALBUMIN SERPL-MCNC: 3.9 G/DL (ref 3.4–5)
ALP BLD-CCNC: 72 U/L (ref 40–129)
ALT SERPL-CCNC: 26 U/L (ref 10–40)
ANION GAP SERPL CALCULATED.3IONS-SCNC: 10 MMOL/L (ref 3–16)
AST SERPL-CCNC: 20 U/L (ref 15–37)
BASOPHILS ABSOLUTE: 0 K/UL (ref 0–0.2)
BASOPHILS RELATIVE PERCENT: 0 %
BILIRUB SERPL-MCNC: 0.3 MG/DL (ref 0–1)
BUN BLDV-MCNC: 14 MG/DL (ref 7–20)
CALCIUM SERPL-MCNC: 9.4 MG/DL (ref 8.3–10.6)
CHLORIDE BLD-SCNC: 100 MMOL/L (ref 99–110)
CO2: 27 MMOL/L (ref 21–32)
CREAT SERPL-MCNC: 0.6 MG/DL (ref 0.9–1.3)
EOSINOPHILS ABSOLUTE: 0 K/UL (ref 0–0.6)
EOSINOPHILS RELATIVE PERCENT: 0 %
GFR AFRICAN AMERICAN: >60
GFR NON-AFRICAN AMERICAN: >60
GLOBULIN: 3.5 G/DL
GLUCOSE BLD-MCNC: 129 MG/DL (ref 70–99)
HCT VFR BLD CALC: 32.7 % (ref 40.5–52.5)
HEMOGLOBIN: 11 G/DL (ref 13.5–17.5)
LACTIC ACID, SEPSIS: 1.3 MMOL/L (ref 0.4–1.9)
LYMPHOCYTES ABSOLUTE: 2.5 K/UL (ref 1–5.1)
LYMPHOCYTES RELATIVE PERCENT: 16 %
MCH RBC QN AUTO: 28.3 PG (ref 26–34)
MCHC RBC AUTO-ENTMCNC: 33.5 G/DL (ref 31–36)
MCV RBC AUTO: 84.6 FL (ref 80–100)
MONOCYTES ABSOLUTE: 0.8 K/UL (ref 0–1.3)
MONOCYTES RELATIVE PERCENT: 5 %
NEUTROPHILS ABSOLUTE: 12.6 K/UL (ref 1.7–7.7)
NEUTROPHILS RELATIVE PERCENT: 79 %
PDW BLD-RTO: 12.5 % (ref 12.4–15.4)
PLATELET # BLD: 331 K/UL (ref 135–450)
PMV BLD AUTO: 6.5 FL (ref 5–10.5)
POTASSIUM SERPL-SCNC: 3.8 MMOL/L (ref 3.5–5.1)
RBC # BLD: 3.87 M/UL (ref 4.2–5.9)
SODIUM BLD-SCNC: 137 MMOL/L (ref 136–145)
TOTAL PROTEIN: 7.4 G/DL (ref 6.4–8.2)
WBC # BLD: 15.9 K/UL (ref 4–11)

## 2020-10-28 PROCEDURE — 99283 EMERGENCY DEPT VISIT LOW MDM: CPT

## 2020-10-28 PROCEDURE — 10061 I&D ABSCESS COMP/MULTIPLE: CPT

## 2020-10-28 PROCEDURE — 80053 COMPREHEN METABOLIC PANEL: CPT

## 2020-10-28 PROCEDURE — 87077 CULTURE AEROBIC IDENTIFY: CPT

## 2020-10-28 PROCEDURE — 87040 BLOOD CULTURE FOR BACTERIA: CPT

## 2020-10-28 PROCEDURE — 83605 ASSAY OF LACTIC ACID: CPT

## 2020-10-28 PROCEDURE — 36415 COLL VENOUS BLD VENIPUNCTURE: CPT

## 2020-10-28 PROCEDURE — 85025 COMPLETE CBC W/AUTO DIFF WBC: CPT

## 2020-10-28 PROCEDURE — 87070 CULTURE OTHR SPECIMN AEROBIC: CPT

## 2020-10-28 PROCEDURE — 96374 THER/PROPH/DIAG INJ IV PUSH: CPT

## 2020-10-28 PROCEDURE — 87186 SC STD MICRODIL/AGAR DIL: CPT

## 2020-10-28 PROCEDURE — 87205 SMEAR GRAM STAIN: CPT

## 2020-10-28 RX ORDER — CLINDAMYCIN PHOSPHATE 600 MG/50ML
600 INJECTION INTRAVENOUS ONCE
Status: DISCONTINUED | OUTPATIENT
Start: 2020-10-28 | End: 2020-10-28

## 2020-10-28 RX ORDER — CLINDAMYCIN HYDROCHLORIDE 300 MG/1
300 CAPSULE ORAL 3 TIMES DAILY
Qty: 30 CAPSULE | Refills: 0 | Status: SHIPPED | OUTPATIENT
Start: 2020-10-28 | End: 2020-11-07

## 2020-10-28 RX ORDER — IBUPROFEN 600 MG/1
600 TABLET ORAL EVERY 6 HOURS PRN
Qty: 20 TABLET | Refills: 0 | Status: SHIPPED | OUTPATIENT
Start: 2020-10-28

## 2020-10-28 ASSESSMENT — PAIN DESCRIPTION - FREQUENCY: FREQUENCY: INTERMITTENT

## 2020-10-28 ASSESSMENT — PAIN SCALES - GENERAL
PAINLEVEL_OUTOF10: 8
PAINLEVEL_OUTOF10: 8

## 2020-10-28 ASSESSMENT — PAIN DESCRIPTION - PAIN TYPE: TYPE: ACUTE PAIN

## 2020-10-28 ASSESSMENT — PAIN DESCRIPTION - ONSET: ONSET: GRADUAL

## 2020-10-28 ASSESSMENT — PAIN DESCRIPTION - PROGRESSION: CLINICAL_PROGRESSION: GRADUALLY WORSENING

## 2020-10-28 ASSESSMENT — PAIN DESCRIPTION - LOCATION: LOCATION: FACE

## 2020-10-28 ASSESSMENT — PAIN DESCRIPTION - DESCRIPTORS: DESCRIPTORS: ACHING

## 2020-10-28 ASSESSMENT — PAIN - FUNCTIONAL ASSESSMENT
PAIN_FUNCTIONAL_ASSESSMENT: 0-10
PAIN_FUNCTIONAL_ASSESSMENT: PREVENTS OR INTERFERES SOME ACTIVE ACTIVITIES AND ADLS

## 2020-10-28 ASSESSMENT — PAIN DESCRIPTION - ORIENTATION: ORIENTATION: RIGHT

## 2020-10-28 NOTE — ED PROVIDER NOTES
[facility]  eMERGENCY dEPARTMENT eNCOUnter      Pt Name: Christina Manning  MRN: [id number]  [place] 1990  Date of evaluation: 10/28/2020  Provider: Nidia Winn MD    [address] [place] [address]       Chief Complaint   Patient presents with    Abscess     Pt presents to ED with area of redness and swelling to right forehead/upper eyelid x 2 days. Denies fever. Denies injury         CRITICAL CARE TIME   Total Critical Care time was 0 minutes, excluding separately reportable procedures. There was a high probability of clinically significant/life threatening deterioration in the patient's condition which required my urgent intervention. HISTORY OF PRESENT ILLNESS  (Location/Symptom, Timing/Onset, Context/Setting, Quality, Duration, Modifying Factors, Severity.)   Christina Manning is a 30 y.o. male who presents to the emergency department complaining of a red swollen area in his right forehead for 2 days. Swelling extends down into his eyelid. Has had some drainage. He denies fever. No injury/trauma. Nursing Notes were reviewed and I agree. REVIEW OF SYSTEMS    (2-9 systems for level 4, 10 or more for level 5)     General: No fever or chills. Eyes: Swelling in the eyelids on the right. ENT: Right forehead swelling. Respiratory: No shortness of breath or cough. GI: No abdominal pain nausea vomiting. Neuro: No dizziness or passing out. Except as noted above the remainder of the review of systems was reviewed and negative. PAST MEDICAL HISTORY     Past Medical History:   Diagnosis Date    Hepatitis C     Illicit drug use     MRSA infection          SURGICAL HISTORY       Past Surgical History:   Procedure Laterality Date    FRACTURE SURGERY      Right wrist, L collar bone         CURRENT MEDICATIONS       Previous Medications    No medications on file       ALLERGIES     Patient has no known allergies.     FAMILY HISTORY       Family History   Problem Relation Age of Onset    Asthma Mother     Substance Abuse Mother     Learning Disabilities Sister     High Cholesterol Paternal Grandmother           SOCIAL HISTORY       Social History     Socioeconomic History    Marital status: Single     Spouse name: Not on file    Number of children: Not on file    Years of education: Not on file    Highest education level: Not on file   Occupational History    Not on file   Social Needs    Financial resource strain: Not on file    Food insecurity     Worry: Not on file     Inability: Not on file   [employer] needs     Medical: Not on file     Non-medical: Not on file   Tobacco Use    Smoking status: Current Every Day Smoker     Packs/day: 1.00     Types: Cigarettes    Smokeless tobacco: Current User     Types: Chew   Substance and Sexual Activity    Alcohol use:  Yes     Alcohol/week: 12.0 standard drinks     Types: 12 Cans of beer per week     Comment: occasional use    Drug use: Not Currently     Types: IV, Opiates      Comment: 0.5 gram a day     Sexual activity: Not on file   Lifestyle    Physical activity     Days per week: Not on file     Minutes per session: Not on file    Stress: Not on file   Relationships    Social connections     Talks on phone: Not on file     Gets together: Not on file     Attends Yarsanism service: Not on file     Active member of club or organization: Not on file     Attends meetings of clubs or organizations: Not on file     Relationship status: Not on file    Intimate partner violence     Fear of current or ex partner: Not on file     Emotionally abused: Not on file     Physically abused: Not on file     Forced sexual activity: Not on file   Other Topics Concern    Not on file   Social History Narrative    Not on file         PHYSICAL EXAM    (up to 7 for level 4, 8 or more for level 5)     ED Triage Vitals [10/28/20 0635]   BP Temp Temp Source Pulse Resp SpO2 Height Weight   132/89 99.1 °F (37.3 °C) Oral 102 16 100 % 6' 1\" (1.854 m) 162 lb 11.2 oz (73.8 kg)       General: Alert thin white male no acute distress. Head: Moderate swelling erythema and induration of the right forehead above the eye including the area above the right eyebrow extending up to the right frontal scalp. There is a small central ulceration about 6 to 7 mm which is draining purulent. Minimally fluctuant. Eyes: There is some mild edema of the right upper and right lower eyelid. No significant erythema of the eyelids. Pupils equal round reactive. Extraocular movements are intact. ENT: TMs are normal.  Nose clear. Oropharynx moist without erythema. Neck: Supple without adenopathy, nontender. Heart: Regular rate and rhythm. No murmurs or gallops noted. Lungs: Breath sounds equal bilaterally and clear. Abdomen: Soft, nondistended, nontender. No masses organomegaly. Musculoskeletal: No extremity edema. Intact symmetrical distal pulses. Skin: Warm and dry, good turgor. Right forehead erythema and induration as noted above. Neuro: Awake, alert, oriented. No focal motor deficits. Normal gait. Mental status: Normal affect.           DIFFERENTIAL DIAGNOSIS   Differential includes but is not limited to acne, dermatitis, cellulitis, abscess      DIAGNOSTIC RESULTS     EKG: All EKG's are interpreted by Ashley Deleon MD in the absence of a cardiologist.      RADIOLOGY:   Non-plain film images such as CT, Ultrasound and MRI are read by the radiologist. Plain radiographic images are visualized and preliminarily interpreted Ashley Deleon MD with the below findings:      Interpretation per the Radiologist below, if available at the time of this note:    No orders to display         ED BEDSIDE ULTRASOUND:   Performed by ED Physician - none    LABS:  Labs Reviewed   CBC WITH AUTO DIFFERENTIAL - Abnormal; Notable for the following components:       Result Value    WBC 15.9 (*)     RBC 3.87 (*)     Hemoglobin 11.0 (*)     Hematocrit 32.7 (*)     Neutrophils Absolute 12.6 (*)     All other components within normal limits    Narrative:     Performed at:  [facility]) [facility]  [address] [facility]   Phone [phone]   COMPREHENSIVE METABOLIC PANEL - Abnormal; Notable for the following components:    Glucose 129 (*)     CREATININE 0.6 (*)     All other components within normal limits    Narrative:     Performed at:  [facility]) - [facility]  [address] [facility] [address] [facility]   Phone [phone]   CULTURE, BLOOD 2   CULTURE, BLOOD 1   CULTURE, WOUND   LACTATE, SEPSIS    Narrative:     Performed at:  [facility]) [facility]  [address] [facility] [address] [facility]   Phone [phone]   LACTATE, SEPSIS       All other labs were within normal range or not returned as of this dictation. EMERGENCY DEPARTMENT COURSE and DIFFERENTIAL DIAGNOSIS/MDM:   Vitals:    Vitals:    10/28/20 0635   BP: 132/89   Pulse: 102   Resp: 16   Temp: 99.1 °F (37.3 °C)   TempSrc: Oral   SpO2: 100%   Weight: 162 lb 11.2 oz (73.8 kg)   Height: 6' 1\" (1.854 m)       Old records reviewed. Patient has a history of MRSA. Patient has a history of right upper extremity cellulitis with septicemia in June of this year. This patient presents with a 2-day history of redness and swelling to his right forehead. No reported fever, chills, body ache. Has an area of erythema and induration of his right forehead. There is a small central open area which is actively draining. There is minimal fluctuance. The area was drained. There was minimal drainage. Loculations were broken. Packing was placed. His laboratory evaluation does show an elevated white blood cell count of 15,900 with a left shift with 79 neutrophils and no bands. His lactic acid is not elevated. His bicarb is normal and he has no anion gap. I do not think he is septic.   Blood cultures were obtained. I ordered a dose of IV clindamycin here, but the patient refused the IV clindamycin. He will be discharged on oral clindamycin. His wound was cultured. His blood was cultured. He was instructed to return if fever or worsening of symptoms. He was instructed to follow-up in 3 days for packing removal.  He was given a primary care referral.  He was told to return here for packing removal if he could not get into see anyone else. Test results, diagnosis, and treatment plan were discussed with the patient. He understands the treatment plan and follow-up as discussed. CONSULTS:  None    PROCEDURES:  Incision/Drainage    Date/Time: 10/28/2020 8:02 AM  Performed by: Danni Dong MD  Authorized by: Danni Dong MD     Consent:     Consent obtained:  Verbal    Consent given by:  Patient    Risks discussed:  Bleeding, incomplete drainage, pain, infection and damage to other organs  Location:     Type:  Abscess    Location:  Head    Head location:  Face  Pre-procedure details:     Skin preparation:  Hibiclens  Anesthesia (see MAR for exact dosages): Anesthesia method:  Local infiltration    Local anesthetic:  Lidocaine 1% w/o epi  Procedure type:     Complexity:  Simple  Procedure details:     Incision types:  Single straight    Incision depth:  Subcutaneous    Scalpel blade:  11    Wound management:  Probed and deloculated    Drainage:  Bloody and purulent    Drainage amount:  Scant    Wound treatment:  Wound left open    Packing materials:  1/4 in iodoform gauze    Amount 1/4\" iodoform:  6  Post-procedure details:     Patient tolerance of procedure: Tolerated well, no immediate complications          FINAL IMPRESSION      1.  Abscess of forehead          DISPOSITION/PLAN   DISPOSITION Decision To Discharge 10/28/2020 07:56:49 AM      PATIENT REFERRED TO:  [facility]) Pre-Services  [phone]          DISCHARGE MEDICATIONS:  New Prescriptions    CLINDAMYCIN (CLEOCIN) 300 MG CAPSULE    Take 1 capsule by mouth 3 times daily for 10 days    IBUPROFEN (ADVIL;MOTRIN) 600 MG TABLET    Take 1 tablet by mouth every 6 hours as needed for Pain       (Please note that portions of this note were completed with a voice recognition program.  Efforts were made to edit the dictations but occasionally words are mis-transcribed.)    Bonnie Roe MD  Attending Emergency Physician        Nelda Beck MD  10/28/20 5937       Nelda Beck MD  10/28/20 6391

## 2020-10-28 NOTE — ED NOTES
Pt refused IV placement, only would consent to a blood draw-Dr Eitan Hernadez aware. Pt refused blood draw in ac, stated blood draw only allowed in right lower arm for both blood cultures. First blood culture obtained from right lower forearm and second blood culture obtained from another site on right forearm. Pt holding his left hand over his right ac during blood draw and refused to move hand ac site wasn't used.      Jazmine Lazaro RN  10/28/20 1506

## 2020-10-28 NOTE — ED NOTES
Pt refused IV antibiotics, Dr Misa Mejia aware. Gauze dressing placed to forehead.        Sanam Rosado RN  10/28/20 0391

## 2020-10-28 NOTE — ED NOTES
Pt states he wants to \"get out of here\" as soon as possible. Pt instructed on importance on finishing abx and to return for worsening symptoms. Pt verbalized understanding. Denies question/concern.        Chad Castillo RN  10/28/20 6988

## 2020-10-29 NOTE — ED NOTES
10/29/2020 1436 Attempted to call patient regarding blood cultures. No answer and unable to leave a voicemail. Will attempt to call back tomorrow.        Raeann Auguste RN  10/29/20 2436

## 2020-10-30 LAB
GRAM STAIN RESULT: ABNORMAL
ORGANISM: ABNORMAL
WOUND/ABSCESS: ABNORMAL

## 2020-10-30 NOTE — RESULT ENCOUNTER NOTE
Patient's positive result has been appropriately evaluated by the provider pool. Patient was contacted and notified of the change in treatment plan. Medication was phoned to the patient's pharmacy per protocol:    Pharmacy:   [address] [address]  Phone: [phone] Fax: [phone]    Phone [phone]  Pharmacist receiving the prescription: Called prescription in.

## 2020-10-30 NOTE — RESULT ENCOUNTER NOTE
Patient's positive result has been appropriately evaluated by the provider pool. Patient was unable to be reached over the phone. A voicemail was left with the patient. Will await a return phone call. Will try to reach patient again tomorrow per protocol. Received a call from Lab 10/29/20 1150 attempted to call patient. No answer. Note left in chart 10/29/2020.

## 2020-10-31 LAB
BLOOD CULTURE, ROUTINE: ABNORMAL
ORGANISM: ABNORMAL

## 2020-10-31 NOTE — RESULT ENCOUNTER NOTE
Culture reviewed, culture is positive but resistant to antibiotics prescribed at discharge. Antibiotic needs to be changed to Bactrim DS, Take 1 tablet twice a day for 10 days.   #20

## 2020-11-01 LAB — CULTURE, BLOOD 2: NORMAL

## 2020-11-07 ENCOUNTER — HOSPITAL ENCOUNTER (EMERGENCY)
Age: 30
Discharge: LEFT BEFORE DISCHARGE | End: 2020-11-07
Attending: EMERGENCY MEDICINE
Payer: MEDICARE

## 2020-11-07 VITALS
TEMPERATURE: 99 F | SYSTOLIC BLOOD PRESSURE: 134 MMHG | OXYGEN SATURATION: 99 % | HEART RATE: 141 BPM | WEIGHT: 160.05 LBS | DIASTOLIC BLOOD PRESSURE: 86 MMHG | HEIGHT: 73 IN | BODY MASS INDEX: 21.21 KG/M2 | RESPIRATION RATE: 14 BRPM

## 2020-11-07 PROCEDURE — 93005 ELECTROCARDIOGRAM TRACING: CPT

## 2020-11-07 PROCEDURE — 99283 EMERGENCY DEPT VISIT LOW MDM: CPT

## 2020-11-07 RX ORDER — SULFAMETHOXAZOLE AND TRIMETHOPRIM 800; 160 MG/1; MG/1
TABLET ORAL
COMMUNITY
Start: 2020-10-30

## 2020-11-07 RX ORDER — 0.9 % SODIUM CHLORIDE 0.9 %
1000 INTRAVENOUS SOLUTION INTRAVENOUS ONCE
Status: DISCONTINUED | OUTPATIENT
Start: 2020-11-07 | End: 2020-11-07

## 2020-11-07 ASSESSMENT — PAIN DESCRIPTION - PAIN TYPE: TYPE: ACUTE PAIN

## 2020-11-07 ASSESSMENT — PAIN SCALES - GENERAL
PAINLEVEL_OUTOF10: 8
PAINLEVEL_OUTOF10: 4

## 2020-11-07 ASSESSMENT — PAIN DESCRIPTION - LOCATION: LOCATION: HEAD;NECK

## 2020-11-07 ASSESSMENT — PAIN DESCRIPTION - DESCRIPTORS: DESCRIPTORS: ACHING;THROBBING

## 2020-11-07 NOTE — LETTER
[place] [address]  Phone: [phone]    Patient: Pranay Anguiano  YOB: 1990  Date: 11/7/2020 Time: 7:34 PM    Leaving the Hospital Against Medical Advice    Chart #:[id number]    This will certify that I, the undersigned,    ______________________________________________________________________    A patient in the above named medical center, having requested discharge and removal from the medical center against the advice of my attending physician(s), hereby release the Emergency Department, its physicians, officers and employees, severally and individually, from any and all liability of any nature whatsoever for any injury or harm or complication of any kind that may result directly or indirectly, by reason of my terminating my stay as a patient from [facility], and hereby waive any and all rights of action I may now have or later acquire as a result of my voluntary departure from [facility] and the termination of my stay as a patient therein. This release is made with the full knowledge of the danger that may result from the action which I am taking.       Date:_______________________                         ___________________________                                                                                    Patient/Legal Representative    Witness:        ____________________________                          ___________________________  Nurse                                                                        Physician

## 2020-11-08 NOTE — ED TRIAGE NOTES
Pt decided to refuse any treatment in er and wants to leave AMA,  Risks benefits again explained, AMA form printed and signed  by pt who has called for a ride home, remains alert oriented and steady normal gait.

## 2020-11-08 NOTE — ED TRIAGE NOTES
Arrives from a apartment hallway were he was found laying with a used needle and syringe nearby  [employer] called when he arrived  pt reportedly up running in [place] screaming per ems he was  awake alert oriented and coorporative and  denied any drug use, felt that coming to er would keep him from going to group home, enroute and on arrival is very active with movement and talking says he is nervous and because he  Took something continues to deny any drug use. On arrival asked to use bathroom was given urine cup and asked for specimen returned with empty cup, stated he did not feel comfortable giving a urine specimen. Was coorporative with obtaining vitals. Was able to get pt to lay on stretcher for attempt to get him to be still and  relax a little to see if heart rate would decrease,  130s was as low that was seen, md arrives speaks with pt who again denies any drug use to md.   Er md tells pt of plan for er course pt verbalizes he may not want any testing. md advises why they are needed due to his rapid  heart rate with no known cause because he repeatedly denies drug use both md and myself attempted to get pt to stay md explaining risks and benefits involved.

## 2020-11-08 NOTE — ED PROVIDER NOTES
6 hours as needed for Pain       ALLERGIES     Patient has no known allergies. FAMILY HISTORY       Family History   Problem Relation Age of Onset    Asthma Mother     Substance Abuse Mother     Learning Disabilities Sister     High Cholesterol Paternal Grandmother           SOCIAL HISTORY       Social History     Socioeconomic History    Marital status: Single     Spouse name: Not on file    Number of children: Not on file    Years of education: Not on file    Highest education level: Not on file   Occupational History    Not on file   Social Needs    Financial resource strain: Not on file    Food insecurity     Worry: Not on file     Inability: Not on file    Transportation needs     Medical: Not on file     Non-medical: Not on file   Tobacco Use    Smoking status: Current Every Day Smoker     Packs/day: 1.00     Types: Cigarettes    Smokeless tobacco: Current User     Types: Chew   Substance and Sexual Activity    Alcohol use:  Yes     Alcohol/week: 12.0 standard drinks     Types: 12 Cans of beer per week     Comment: occasional use    Drug use: Not Currently     Types: IV, Opiates      Comment: 0.5 gram a day     Sexual activity: Not on file   Lifestyle    Physical activity     Days per week: Not on file     Minutes per session: Not on file    Stress: Not on file   Relationships    Social connections     Talks on phone: Not on file     Gets together: Not on file     Attends Worship service: Not on file     Active member of club or organization: Not on file     Attends meetings of clubs or organizations: Not on file     Relationship status: Not on file    Intimate partner violence     Fear of current or ex partner: Not on file     Emotionally abused: Not on file     Physically abused: Not on file     Forced sexual activity: Not on file   Other Topics Concern    Not on file   Social History Narrative    Not on file         PHYSICAL EXAM    (up to 7 for level 4, 8 or more for level 5) LACTATE, SEPSIS       All other labs were within normal range or not returned as of this dictation. EMERGENCY DEPARTMENT COURSE and DIFFERENTIAL DIAGNOSIS/MDM:   Vitals:    Vitals:    11/07/20 1917   BP: 134/86   Pulse: 141   Resp: 14   Temp: 99 °F (37.2 °C)   TempSrc: Oral   SpO2: 99%   Weight: 160 lb 0.9 oz (72.6 kg)   Height: 6' 1\" (1.854 m)       This patient presented by EMS with a history that he was a possible drug overdose. The patient states he remembers being at his friend's house and then he remembers EMS arriving. He adamantly denies using any drugs. On arrival he was awake, alert, and oriented. He was cooperative. He allowed us to examine him and take his vital signs. We explained to him that his heart rate was elevated and that we were concerned as to the cause of his abnormal vital signs and the episode that was described by EMS. In light of the fact that he denies any drug use I told him we needed to work him up further determine the reason for his rapid heart rate and his clinical presentation. He asked me what test I would like to do. I reviewed all of the tests that I would like to perform. He immediately said that he did not want any testing done and they was going to leave. He refused to have an EKG done. He refused to have any blood work drawn. He refused to provide a urinalysis. The patient was signing out [address] He has the capacity to make that decision fully understands the risk include but are not limited to life-threatening overdose, other potential life-threatening problem which has resulted in a rapid heartbeat of undetermined cause. He understands the risk up to and including death and is going to sign out [address] fully understanding the risk. CONSULTS:  None    PROCEDURES:  None    FINAL IMPRESSION      1.  Accidental drug overdose, initial encounter          DISPOSITION/PLAN   DISPOSITION [place] Mar 11/07/2020 07:43:16 PM      PATIENT REFERRED TO:  No follow-up provider specified.     DISCHARGE MEDICATIONS:  New Prescriptions    No medications on file       (Please note that portions of this note were completed with a voice recognition program.  Efforts were made to edit the dictations but occasionally words are mis-transcribed.)    Nery Jimenez MD  Attending Emergency Physician        Gracie Cushing, MD  11/07/20 6242

## 2022-03-12 ENCOUNTER — HOSPITAL ENCOUNTER (EMERGENCY)
Age: 32
Discharge: HOME | End: 2022-03-12
Payer: MEDICARE

## 2022-03-12 ENCOUNTER — APPOINTMENT (OUTPATIENT)
Dept: GENERAL RADIOLOGY | Age: 32
End: 2022-03-12
Payer: MEDICARE

## 2022-03-12 VITALS
BODY MASS INDEX: 21.2 KG/M2 | OXYGEN SATURATION: 100 % | DIASTOLIC BLOOD PRESSURE: 57 MMHG | HEART RATE: 66 BPM | RESPIRATION RATE: 16 BRPM | WEIGHT: 160 LBS | TEMPERATURE: 98.2 F | HEIGHT: 73 IN | SYSTOLIC BLOOD PRESSURE: 114 MMHG

## 2022-03-12 DIAGNOSIS — F19.90 IVDU (INTRAVENOUS DRUG USER): ICD-10-CM

## 2022-03-12 DIAGNOSIS — R07.9 CHEST PAIN, UNSPECIFIED TYPE: Primary | ICD-10-CM

## 2022-03-12 LAB
A/G RATIO: 1.3 (ref 1.1–2.2)
ALBUMIN SERPL-MCNC: 4.5 G/DL (ref 3.4–5)
ALP BLD-CCNC: 83 U/L (ref 40–129)
ALT SERPL-CCNC: 14 U/L (ref 10–40)
ANION GAP SERPL CALCULATED.3IONS-SCNC: 14 MMOL/L (ref 3–16)
AST SERPL-CCNC: 19 U/L (ref 15–37)
BASOPHILS ABSOLUTE: 0 K/UL (ref 0–0.2)
BASOPHILS RELATIVE PERCENT: 0.3 %
BILIRUB SERPL-MCNC: 0.3 MG/DL (ref 0–1)
BUN BLDV-MCNC: 10 MG/DL (ref 7–20)
CALCIUM SERPL-MCNC: 9.6 MG/DL (ref 8.3–10.6)
CHLORIDE BLD-SCNC: 100 MMOL/L (ref 99–110)
CO2: 22 MMOL/L (ref 21–32)
CREAT SERPL-MCNC: 0.7 MG/DL (ref 0.9–1.3)
EOSINOPHILS ABSOLUTE: 0.1 K/UL (ref 0–0.6)
EOSINOPHILS RELATIVE PERCENT: 0.8 %
GFR AFRICAN AMERICAN: >60
GFR NON-AFRICAN AMERICAN: >60
GLUCOSE BLD-MCNC: 96 MG/DL (ref 70–99)
HCT VFR BLD CALC: 33.9 % (ref 40.5–52.5)
HEMOGLOBIN: 11.2 G/DL (ref 13.5–17.5)
LIPASE: 34 U/L (ref 13–60)
LYMPHOCYTES ABSOLUTE: 2.6 K/UL (ref 1–5.1)
LYMPHOCYTES RELATIVE PERCENT: 26.4 %
MCH RBC QN AUTO: 26.2 PG (ref 26–34)
MCHC RBC AUTO-ENTMCNC: 33 G/DL (ref 31–36)
MCV RBC AUTO: 79.4 FL (ref 80–100)
MONOCYTES ABSOLUTE: 0.9 K/UL (ref 0–1.3)
MONOCYTES RELATIVE PERCENT: 9.1 %
NEUTROPHILS ABSOLUTE: 6.2 K/UL (ref 1.7–7.7)
NEUTROPHILS RELATIVE PERCENT: 63.4 %
PDW BLD-RTO: 15.7 % (ref 12.4–15.4)
PLATELET # BLD: 340 K/UL (ref 135–450)
PMV BLD AUTO: 6.6 FL (ref 5–10.5)
POTASSIUM REFLEX MAGNESIUM: 4.8 MMOL/L (ref 3.5–5.1)
PRO-BNP: 50 PG/ML (ref 0–124)
RBC # BLD: 4.27 M/UL (ref 4.2–5.9)
SODIUM BLD-SCNC: 136 MMOL/L (ref 136–145)
TOTAL PROTEIN: 8 G/DL (ref 6.4–8.2)
TROPONIN: <0.01 NG/ML
WBC # BLD: 9.7 K/UL (ref 4–11)

## 2022-03-12 PROCEDURE — 99285 EMERGENCY DEPT VISIT HI MDM: CPT

## 2022-03-12 PROCEDURE — 83880 ASSAY OF NATRIURETIC PEPTIDE: CPT

## 2022-03-12 PROCEDURE — 85025 COMPLETE CBC W/AUTO DIFF WBC: CPT

## 2022-03-12 PROCEDURE — 71045 X-RAY EXAM CHEST 1 VIEW: CPT

## 2022-03-12 PROCEDURE — 84484 ASSAY OF TROPONIN QUANT: CPT

## 2022-03-12 PROCEDURE — 93005 ELECTROCARDIOGRAM TRACING: CPT | Performed by: EMERGENCY MEDICINE

## 2022-03-12 PROCEDURE — 6370000000 HC RX 637 (ALT 250 FOR IP): Performed by: PHYSICIAN ASSISTANT

## 2022-03-12 PROCEDURE — 83690 ASSAY OF LIPASE: CPT

## 2022-03-12 PROCEDURE — 80053 COMPREHEN METABOLIC PANEL: CPT

## 2022-03-12 RX ORDER — ASPIRIN 81 MG/1
324 TABLET, CHEWABLE ORAL ONCE
Status: COMPLETED | OUTPATIENT
Start: 2022-03-12 | End: 2022-03-12

## 2022-03-12 RX ADMIN — ASPIRIN 81 MG 324 MG: 81 TABLET ORAL at 14:30

## 2022-03-12 ASSESSMENT — PAIN DESCRIPTION - DESCRIPTORS: DESCRIPTORS: CRUSHING

## 2022-03-12 ASSESSMENT — PAIN DESCRIPTION - PROGRESSION: CLINICAL_PROGRESSION: GRADUALLY WORSENING

## 2022-03-12 ASSESSMENT — PAIN DESCRIPTION - FREQUENCY: FREQUENCY: CONTINUOUS

## 2022-03-12 ASSESSMENT — PAIN DESCRIPTION - ONSET: ONSET: SUDDEN

## 2022-03-12 ASSESSMENT — PAIN SCALES - GENERAL
PAINLEVEL_OUTOF10: 3
PAINLEVEL_OUTOF10: 7

## 2022-03-12 ASSESSMENT — PAIN - FUNCTIONAL ASSESSMENT
PAIN_FUNCTIONAL_ASSESSMENT: PREVENTS OR INTERFERES SOME ACTIVE ACTIVITIES AND ADLS
PAIN_FUNCTIONAL_ASSESSMENT: 0-10

## 2022-03-12 ASSESSMENT — PAIN DESCRIPTION - ORIENTATION: ORIENTATION: LEFT;MID

## 2022-03-12 ASSESSMENT — PAIN DESCRIPTION - LOCATION: LOCATION: CHEST

## 2022-03-12 ASSESSMENT — PAIN DESCRIPTION - PAIN TYPE: TYPE: ACUTE PAIN

## 2022-03-12 NOTE — ED PROVIDER NOTES
Pt Name: Kahlil Davis  MRN: [id number]  [place] 1990  Date of evaluation: 3/12/2022    EKG Interpretation    The purpose of this note is for preliminary EKG interpretation only. This patient was seen independently by the mid-level provider and was not seen by this provider. EKG visualized preliminarily interpreted by myself shows sinus rhythm at a rate of 82. The axis is normal at 85. He does have an RSR prime pattern but no block. Intervals are normal.  There is borderline left ventricular hypertrophy seen in lead V4, V5 and V6. No significant change compared to June 23 of 2020.         Keon Rodney MD  03/12/22 9820

## 2022-03-13 LAB
EKG ATRIAL RATE: 82 BPM
EKG DIAGNOSIS: NORMAL
EKG P AXIS: 64 DEGREES
EKG P-R INTERVAL: 126 MS
EKG Q-T INTERVAL: 394 MS
EKG QRS DURATION: 92 MS
EKG QTC CALCULATION (BAZETT): 460 MS
EKG R AXIS: 85 DEGREES
EKG T AXIS: 77 DEGREES
EKG VENTRICULAR RATE: 82 BPM

## 2022-03-13 PROCEDURE — 93010 ELECTROCARDIOGRAM REPORT: CPT | Performed by: INTERNAL MEDICINE

## 2022-03-13 NOTE — ED PROVIDER NOTES
[address] [facility]        Pt Name: Kelvin Francis  MRN: [id number]  [place] 1990  Date of evaluation: 3/12/2022  Provider: Arthur Baird PA-C  PCP: No primary care provider on file. Note Started: 8:22 AM EDT       MADHU. I have evaluated this patient. My supervising physician was available for consultation. Brennon Mccormack      CHIEF COMPLAINT       Chief Complaint   Patient presents with    Chest Pain     Chest pain while in police custody. [employer] [employer] at bedside. HISTORY OF PRESENT ILLNESS   (Location, Timing/Onset, Context/Setting, Quality, Duration, Modifying Factors, Severity, Associated Signs and Symptoms)  Note limiting factors. Chief Complaint: Chest pain    Kelvin Francis is a 28 y.o. male who presents with police. He is under arrest.  Usual state of health this morning. Years injection on heroin at 10 AM.  By noon he was involved with police and under arrest.  Brought in because he complained of chest pain shortly after being arrested. He has no history of CAD, PE or chest problems. Indicates he felt pressure and sharpness at the same time. States his brother did IV drugs and passed away at age 25. Indicates slightly short of breath. He does smoke. History of asthma. No history of COPD. No gastrointestinal or urinary complaints. No history of diaphoresis. Patient does state he is feeling better once he is here in the ED. Nursing Notes were all reviewed and agreed with or any disagreements were addressed in the HPI. REVIEW OF SYSTEMS    (2-9 systems for level 4, 10 or more for level 5)     Review of Systems    Positives and Pertinent negatives as per HPI. Except as noted above in the ROS, all other systems were reviewed and negative.        PAST MEDICAL HISTORY     Past Medical History:   Diagnosis Date    Hepatitis C     Illicit drug use     MRSA bacteremia 10/28/2020    MRSA infection 10/28/2020    abscess face         SURGICAL HISTORY     Past Surgical History:   Procedure Laterality Date    FRACTURE SURGERY      Right wrist, L collar bone         CURRENTMEDICATIONS       Discharge Medication List as of 3/12/2022  3:45 PM      CONTINUE these medications which have NOT CHANGED    Details   sulfamethoxazole-trimethoprim (BACTRIM DS;SEPTRA DS) 800-160 MG per tablet TK 1 T PO  BIDHistorical Med      ibuprofen (ADVIL;MOTRIN) 600 MG tablet Take 1 tablet by mouth every 6 hours as needed for Pain, Disp-20 tablet,R-0Print               ALLERGIES     Patient has no known allergies. FAMILYHISTORY       Family History   Problem Relation Age of Onset   Jessie Segal Asthma Mother     Substance Abuse Mother     Learning Disabilities Sister     High Cholesterol Paternal Grandmother           SOCIAL HISTORY       Social History     Tobacco Use    Smoking status: Current Every Day Smoker     Packs/day: 1.00     Types: Cigarettes    Smokeless tobacco: Current User     Types: Chew   Vaping Use    Vaping Use: Never used   Substance Use Topics    Alcohol use: Yes     Alcohol/week: 12.0 standard drinks     Types: 12 Cans of beer per week     Comment: occasional use    Drug use: Yes     Types: IV, Opiates , Methamphetamines (Crystal Meth)     Comment: 0.5 gram a day        SCREENINGS    Harrison Coma Scale  Eye Opening: Spontaneous  Best Verbal Response: Oriented  Best Motor Response: Obeys commands  Harrison Coma Scale Score: 15        PHYSICAL EXAM    (up to 7 for level 4, 8 or more for level 5)     ED Triage Vitals [03/12/22 1357]   BP Temp Temp Source Pulse Resp SpO2 Height Weight   (!) 147/86 98.1 °F (36.7 °C) Oral 82 18 98 % 6' 1\" (1.854 m) 160 lb (72.6 kg)       Physical Exam  Vitals and nursing note reviewed. Constitutional:       Appearance: Normal appearance. He is well-developed and normal weight. HENT:      Head: Normocephalic and atraumatic.       Right Ear: Tympanic membrane, ear canal and external ear normal.      Left Ear: Tympanic membrane, ear canal and external ear normal.      Nose: Nose normal.      Mouth/Throat:      Mouth: Mucous membranes are moist.      Pharynx: Oropharynx is clear. Eyes:      General: No scleral icterus. Right eye: No discharge. Left eye: No discharge. Conjunctiva/sclera: Conjunctivae normal.   Neck:      Comments: Patient has on the base of the left neck injection sites. None appear fresh at this time. Cardiovascular:      Rate and Rhythm: Normal rate and regular rhythm. Heart sounds: Normal heart sounds. Pulmonary:      Effort: Pulmonary effort is normal.      Breath sounds: Normal breath sounds. Comments: Minor anterior chest soreness with pressure applied. No crepitation or crackles. Chest:      Chest wall: Tenderness present. Abdominal:      General: Abdomen is flat. Bowel sounds are normal.      Palpations: Abdomen is soft. Tenderness: There is no abdominal tenderness. Musculoskeletal:         General: Normal range of motion. Cervical back: Normal range of motion and neck supple. Skin:     General: Skin is warm and dry. Findings: No rash. Neurological:      General: No focal deficit present. Mental Status: He is alert and oriented to person, place, and time. Mental status is at baseline. Psychiatric:         Mood and Affect: Mood normal.         Behavior: Behavior normal.         Thought Content:  Thought content normal.         Judgment: Judgment normal.         DIAGNOSTIC RESULTS   LABS:    Labs Reviewed   CBC WITH AUTO DIFFERENTIAL - Abnormal; Notable for the following components:       Result Value    Hemoglobin 11.2 (*)     Hematocrit 33.9 (*)     MCV 79.4 (*)     RDW 15.7 (*)     All other components within normal limits   COMPREHENSIVE METABOLIC PANEL W/ REFLEX TO MG FOR LOW K - Abnormal; Notable for the following components:    CREATININE 0.7 (*)     All other components within normal limits   TROPONIN LIPASE   BRAIN NATRIURETIC PEPTIDE       When ordered only abnormal lab results are displayed. All other labs were within normal range or not returned as of this dictation. EKG: When ordered, EKG's are interpreted by the Emergency Department Physician in the absence of a cardiologist.  Please see their note for interpretation of EKG. RADIOLOGY:   Non-plain film images such as CT, Ultrasound and MRI are read by the radiologist. Plain radiographic images are visualized and preliminarily interpreted by the ED Provider with the below findings:        Interpretation per the Radiologist below, if available at the time of this note:    XR CHEST PORTABLE   Final Result   No acute abnormality           XR CHEST PORTABLE    Result Date: 3/12/2022  EXAMINATION: ONE XRAY VIEW OF THE CHEST 3/12/2022 2:02 pm COMPARISON: 06/23/2020 HISTORY: ORDERING SYSTEM PROVIDED HISTORY: Chest pain TECHNOLOGIST PROVIDED HISTORY: Reason for exam:->Chest pain Reason for Exam: Chest Pain Relevant Medical/Surgical History: Hx of drug abuse FINDINGS: The heart and pulmonary vascularity are within normal limits. There is evidence of prior granulomatous infection. There are no objective signs acute infiltrate or pleural effusion. The osseous structures are intact. No acute abnormality           PROCEDURES   Unless otherwise noted below, none     Procedures    CRITICAL CARE TIME       CONSULTS:  None      EMERGENCY DEPARTMENT COURSE and DIFFERENTIAL DIAGNOSIS/MDM:   Vitals:    Vitals:    03/12/22 1357 03/12/22 1500 03/12/22 1555   BP: (!) 147/86 122/74 (!) 114/57   Pulse: 82 63 66   Resp: 18 16 16   Temp: 98.1 °F (36.7 °C)  98.2 °F (36.8 °C)   TempSrc: Oral  Oral   SpO2: 98% 99% 100%   Weight: 160 lb (72.6 kg)     Height: 6' 1\" (1.854 m)         Patient was given the following medications:  Medications   aspirin chewable tablet 324 mg (324 mg Oral Given 3/12/22 1430)           Oratory studies are normal or within normal limits.   Chest x-ray unremarkable. EKG interpreted by attending physician shows no acute patterns. Patient asymptomatic and will be discharged in the care of the  as he is under arrest.  No medications at discharge. ED arrival aspirin 324 mg given. FINAL IMPRESSION      1. Chest pain, unspecified type    2. IVDU (intravenous drug user)          DISPOSITION/PLAN   DISPOSITION Decision To Discharge 03/12/2022 03:43:25 PM      PATIENT REFERRED TO:  Your healthcare provider    Schedule an appointment as soon as possible for a visit on 3/17/2022      [facility]) Pre-Services  [phone]  Schedule an appointment as soon as possible for a visit on 3/17/2022      [facility] Emergency Department  [address] [place] [address]  [phone]  Go to   If symptoms worsen      DISCHARGE MEDICATIONS:  Discharge Medication List as of 3/12/2022  3:45 PM          DISCONTINUED MEDICATIONS:  Discharge Medication List as of 3/12/2022  3:45 PM                 (Please note that portions of this note were completed with a voice recognition program.  Efforts were made to edit the dictations but occasionally words are mis-transcribed. )    Bhavik Lopez PA-C (electronically signed)           Bhavik Lopez PA-C  03/13/22 [id number]

## 2023-06-23 ENCOUNTER — HOSPITAL ENCOUNTER (EMERGENCY)
Age: 33
Discharge: HOME | End: 2023-06-23
Payer: MEDICAID

## 2023-06-23 ENCOUNTER — APPOINTMENT (OUTPATIENT)
Dept: GENERAL RADIOLOGY | Age: 33
End: 2023-06-23
Payer: MEDICAID

## 2023-06-23 VITALS
SYSTOLIC BLOOD PRESSURE: 135 MMHG | WEIGHT: 151.68 LBS | BODY MASS INDEX: 20.1 KG/M2 | HEIGHT: 73 IN | HEART RATE: 87 BPM | TEMPERATURE: 98 F | OXYGEN SATURATION: 99 % | DIASTOLIC BLOOD PRESSURE: 79 MMHG | RESPIRATION RATE: 16 BRPM

## 2023-06-23 DIAGNOSIS — L03.114 CELLULITIS OF LEFT UPPER EXTREMITY: Primary | ICD-10-CM

## 2023-06-23 LAB
ANION GAP SERPL CALCULATED.3IONS-SCNC: 10 MMOL/L (ref 3–16)
BASOPHILS # BLD: 0 K/UL (ref 0–0.2)
BASOPHILS NFR BLD: 0.3 %
BUN SERPL-MCNC: 14 MG/DL (ref 7–20)
CALCIUM SERPL-MCNC: 9.1 MG/DL (ref 8.3–10.6)
CHLORIDE SERPL-SCNC: 101 MMOL/L (ref 99–110)
CO2 SERPL-SCNC: 28 MMOL/L (ref 21–32)
CREAT SERPL-MCNC: 0.8 MG/DL (ref 0.9–1.3)
DEPRECATED RDW RBC AUTO: 16.7 % (ref 12.4–15.4)
EOSINOPHIL # BLD: 0 K/UL (ref 0–0.6)
EOSINOPHIL NFR BLD: 0.3 %
GFR SERPLBLD CREATININE-BSD FMLA CKD-EPI: >60 ML/MIN/{1.73_M2}
GLUCOSE SERPL-MCNC: 117 MG/DL (ref 70–99)
HCT VFR BLD AUTO: 30.7 % (ref 40.5–52.5)
HGB BLD-MCNC: 10.1 G/DL (ref 13.5–17.5)
LACTATE BLDV-SCNC: 0.6 MMOL/L (ref 0.4–2)
LYMPHOCYTES # BLD: 1.8 K/UL (ref 1–5.1)
LYMPHOCYTES NFR BLD: 13.9 %
MCH RBC QN AUTO: 27 PG (ref 26–34)
MCHC RBC AUTO-ENTMCNC: 32.8 G/DL (ref 31–36)
MCV RBC AUTO: 82.1 FL (ref 80–100)
MONOCYTES # BLD: 1 K/UL (ref 0–1.3)
MONOCYTES NFR BLD: 7.6 %
NEUTROPHILS # BLD: 10.3 K/UL (ref 1.7–7.7)
NEUTROPHILS NFR BLD: 77.9 %
PLATELET # BLD AUTO: 328 K/UL (ref 135–450)
PMV BLD AUTO: 6.6 FL (ref 5–10.5)
POTASSIUM SERPL-SCNC: 4.4 MMOL/L (ref 3.5–5.1)
RBC # BLD AUTO: 3.75 M/UL (ref 4.2–5.9)
SODIUM SERPL-SCNC: 139 MMOL/L (ref 136–145)
WBC # BLD AUTO: 13.2 K/UL (ref 4–11)

## 2023-06-23 PROCEDURE — 85025 COMPLETE CBC W/AUTO DIFF WBC: CPT

## 2023-06-23 PROCEDURE — 80048 BASIC METABOLIC PNL TOTAL CA: CPT

## 2023-06-23 PROCEDURE — 99284 EMERGENCY DEPT VISIT MOD MDM: CPT

## 2023-06-23 PROCEDURE — 83605 ASSAY OF LACTIC ACID: CPT

## 2023-06-23 PROCEDURE — 73090 X-RAY EXAM OF FOREARM: CPT

## 2023-06-23 PROCEDURE — 6370000000 HC RX 637 (ALT 250 FOR IP): Performed by: NURSE PRACTITIONER

## 2023-06-23 RX ORDER — CEPHALEXIN 500 MG/1
500 CAPSULE ORAL ONCE
Status: COMPLETED | OUTPATIENT
Start: 2023-06-23 | End: 2023-06-23

## 2023-06-23 RX ORDER — IBUPROFEN 800 MG/1
800 TABLET ORAL EVERY 8 HOURS PRN
Qty: 20 TABLET | Refills: 0 | Status: SHIPPED | OUTPATIENT
Start: 2023-06-23

## 2023-06-23 RX ORDER — ACETAMINOPHEN 500 MG
1000 TABLET ORAL 3 TIMES DAILY PRN
Qty: 180 TABLET | Refills: 0 | Status: SHIPPED | OUTPATIENT
Start: 2023-06-23

## 2023-06-23 RX ORDER — SULFAMETHOXAZOLE AND TRIMETHOPRIM 800; 160 MG/1; MG/1
1 TABLET ORAL 2 TIMES DAILY
Qty: 20 TABLET | Refills: 0 | Status: SHIPPED | OUTPATIENT
Start: 2023-06-23 | End: 2023-07-03

## 2023-06-23 RX ORDER — IBUPROFEN 400 MG/1
800 TABLET ORAL ONCE
Status: COMPLETED | OUTPATIENT
Start: 2023-06-23 | End: 2023-06-23

## 2023-06-23 RX ORDER — CEPHALEXIN 500 MG/1
500 CAPSULE ORAL 4 TIMES DAILY
Qty: 40 CAPSULE | Refills: 0 | Status: SHIPPED | OUTPATIENT
Start: 2023-06-23 | End: 2023-07-03

## 2023-06-23 RX ORDER — SULFAMETHOXAZOLE AND TRIMETHOPRIM 800; 160 MG/1; MG/1
1 TABLET ORAL ONCE
Status: COMPLETED | OUTPATIENT
Start: 2023-06-23 | End: 2023-06-23

## 2023-06-23 RX ORDER — ACETAMINOPHEN 500 MG
1000 TABLET ORAL ONCE
Status: COMPLETED | OUTPATIENT
Start: 2023-06-23 | End: 2023-06-23

## 2023-06-23 RX ADMIN — SULFAMETHOXAZOLE AND TRIMETHOPRIM 1 TABLET: 800; 160 TABLET ORAL at 14:01

## 2023-06-23 RX ADMIN — CEPHALEXIN 500 MG: 500 CAPSULE ORAL at 14:01

## 2023-06-23 RX ADMIN — ACETAMINOPHEN 1000 MG: 500 TABLET ORAL at 13:42

## 2023-06-23 RX ADMIN — IBUPROFEN 800 MG: 400 TABLET, FILM COATED ORAL at 13:41

## 2023-06-23 ASSESSMENT — PAIN DESCRIPTION - PAIN TYPE
TYPE: ACUTE PAIN
TYPE: ACUTE PAIN

## 2023-06-23 ASSESSMENT — PAIN DESCRIPTION - ORIENTATION
ORIENTATION: LEFT
ORIENTATION: LEFT

## 2023-06-23 ASSESSMENT — PAIN SCALES - GENERAL
PAINLEVEL_OUTOF10: 3
PAINLEVEL_OUTOF10: 7

## 2023-06-23 ASSESSMENT — PAIN DESCRIPTION - LOCATION
LOCATION: ARM
LOCATION: ARM

## 2023-06-23 ASSESSMENT — PAIN DESCRIPTION - ONSET: ONSET: ON-GOING

## 2023-06-23 ASSESSMENT — LIFESTYLE VARIABLES
HOW MANY STANDARD DRINKS CONTAINING ALCOHOL DO YOU HAVE ON A TYPICAL DAY: PATIENT DOES NOT DRINK
HOW OFTEN DO YOU HAVE A DRINK CONTAINING ALCOHOL: NEVER

## 2023-06-23 ASSESSMENT — PAIN DESCRIPTION - FREQUENCY
FREQUENCY: CONTINUOUS
FREQUENCY: CONTINUOUS

## 2023-06-23 ASSESSMENT — ENCOUNTER SYMPTOMS
SHORTNESS OF BREATH: 0
COLOR CHANGE: 1
COUGH: 0
VOMITING: 0
NAUSEA: 0
BACK PAIN: 0
EYE PAIN: 0
ABDOMINAL PAIN: 0
SORE THROAT: 0
DIARRHEA: 0

## 2023-06-23 ASSESSMENT — PAIN DESCRIPTION - DESCRIPTORS
DESCRIPTORS: ACHING
DESCRIPTORS: SHARP;SHOOTING

## 2023-06-23 NOTE — ED PROVIDER NOTES
Hot to the touch. No rashes. Neurological:      General: No focal deficit present. Mental Status: He is alert and oriented to person, place, and time. GCS: GCS eye subscore is 4. GCS verbal subscore is 5. GCS motor subscore is 6. Cranial Nerves: No cranial nerve deficit, dysarthria or facial asymmetry. Sensory: Sensation is intact. No sensory deficit. Motor: Motor function is intact. No weakness. Coordination: Coordination normal.      Gait: Gait is intact. Gait normal.   Psychiatric:         Mood and Affect: Mood normal.         Behavior: Behavior normal.         Thought Content: Thought content normal.       DIAGNOSTIC RESULTS   LABS:    Labs Reviewed   CBC WITH AUTO DIFFERENTIAL - Abnormal; Notable for the following components:       Result Value    WBC 13.2 (*)     RBC 3.75 (*)     Hemoglobin 10.1 (*)     Hematocrit 30.7 (*)     RDW 16.7 (*)     Neutrophils Absolute 10.3 (*)     All other components within normal limits   BASIC METABOLIC PANEL W/ REFLEX TO MG FOR LOW K - Abnormal; Notable for the following components:    Glucose 117 (*)     Creatinine 0.8 (*)     All other components within normal limits   LACTIC ACID       When ordered only abnormal lab results are displayed. All other labs were within normal range or not returned as of this dictation. EKG: When ordered, EKG's are interpreted by the Emergency Department Physician in the absence of a cardiologist.  Please see their note for interpretation of EKG. RADIOLOGY:   Non-plain film images such as CT, Ultrasound and MRI are read by the radiologist. Plain radiographic images are visualized and preliminarily interpreted by the ED Provider with the below findings:    No fracture or dislocation    Interpretation per the Radiologist below, if available at the time of this note:    XR RADIUS ULNA LEFT (2 VIEWS)   Preliminary Result   1. No evidence of radiopaque foreign body. No evidence of subcutaneous   emphysema.    2.

## 2023-06-23 NOTE — ED NOTES
Discharge and education instructions reviewed. Patient verbalized understanding, teach-back successful. Patient denied questions at this time. No acute distress noted. Patient instructed to follow-up as noted - return to emergency department if symptoms worsen. Patient verbalized understanding. Discharged per EDMD with discharge instructions.        Tresa Dasilva RN  06/23/23 9798

## 2023-06-23 NOTE — ED TRIAGE NOTES
Pt ambulates to triage with no assistance. Pt states that he is a IV drug user and injects daily. Having pain in left arm that radiates to hand. Injected this morning in right arm.

## 2024-04-16 ENCOUNTER — APPOINTMENT (OUTPATIENT)
Dept: MRI IMAGING | Age: 34
End: 2024-04-16
Payer: MEDICAID

## 2024-04-16 ENCOUNTER — HOSPITAL ENCOUNTER (EMERGENCY)
Age: 34
Discharge: HOME | End: 2024-04-16
Payer: MEDICAID

## 2024-04-16 VITALS
HEART RATE: 87 BPM | HEIGHT: 72 IN | TEMPERATURE: 98.6 F | DIASTOLIC BLOOD PRESSURE: 62 MMHG | OXYGEN SATURATION: 99 % | RESPIRATION RATE: 16 BRPM | SYSTOLIC BLOOD PRESSURE: 105 MMHG | WEIGHT: 148.59 LBS | BODY MASS INDEX: 20.13 KG/M2

## 2024-04-16 DIAGNOSIS — F19.90 IVDU (INTRAVENOUS DRUG USER): ICD-10-CM

## 2024-04-16 DIAGNOSIS — M51.36 BULGING LUMBAR DISC: ICD-10-CM

## 2024-04-16 DIAGNOSIS — M54.50 LUMBAR PAIN: Primary | ICD-10-CM

## 2024-04-16 DIAGNOSIS — M48.061 SPINAL STENOSIS OF LUMBAR REGION, UNSPECIFIED WHETHER NEUROGENIC CLAUDICATION PRESENT: ICD-10-CM

## 2024-04-16 LAB
BASOPHILS # BLD: 0 K/UL (ref 0–0.2)
BASOPHILS NFR BLD: 0.2 %
CRP SERPL-MCNC: 104.6 MG/L (ref 0–5.1)
DEPRECATED RDW RBC AUTO: 16.5 % (ref 12.4–15.4)
EOSINOPHIL # BLD: 0 K/UL (ref 0–0.6)
EOSINOPHIL NFR BLD: 0.1 %
ERYTHROCYTE [SEDIMENTATION RATE] IN BLOOD BY WESTERGREN METHOD: 57 MM/HR (ref 0–15)
HCT VFR BLD AUTO: 31.2 % (ref 40.5–52.5)
HGB BLD-MCNC: 10.4 G/DL (ref 13.5–17.5)
LACTATE BLDV-SCNC: 2 MMOL/L (ref 0.4–2)
LYMPHOCYTES # BLD: 1.7 K/UL (ref 1–5.1)
LYMPHOCYTES NFR BLD: 15.6 %
MCH RBC QN AUTO: 26.8 PG (ref 26–34)
MCHC RBC AUTO-ENTMCNC: 33.4 G/DL (ref 31–36)
MCV RBC AUTO: 80.2 FL (ref 80–100)
MONOCYTES # BLD: 0.8 K/UL (ref 0–1.3)
MONOCYTES NFR BLD: 7.1 %
NEUTROPHILS # BLD: 8.5 K/UL (ref 1.7–7.7)
NEUTROPHILS NFR BLD: 77 %
PLATELET # BLD AUTO: 302 K/UL (ref 135–450)
PMV BLD AUTO: 6.9 FL (ref 5–10.5)
RBC # BLD AUTO: 3.89 M/UL (ref 4.2–5.9)
WBC # BLD AUTO: 11 K/UL (ref 4–11)

## 2024-04-16 PROCEDURE — 96375 TX/PRO/DX INJ NEW DRUG ADDON: CPT

## 2024-04-16 PROCEDURE — 99285 EMERGENCY DEPT VISIT HI MDM: CPT

## 2024-04-16 PROCEDURE — A9579 GAD-BASE MR CONTRAST NOS,1ML: HCPCS | Performed by: NURSE PRACTITIONER

## 2024-04-16 PROCEDURE — 86140 C-REACTIVE PROTEIN: CPT

## 2024-04-16 PROCEDURE — 96365 THER/PROPH/DIAG IV INF INIT: CPT

## 2024-04-16 PROCEDURE — 36415 COLL VENOUS BLD VENIPUNCTURE: CPT

## 2024-04-16 PROCEDURE — 2580000003 HC RX 258: Performed by: NURSE PRACTITIONER

## 2024-04-16 PROCEDURE — 6360000002 HC RX W HCPCS: Performed by: NURSE PRACTITIONER

## 2024-04-16 PROCEDURE — 72158 MRI LUMBAR SPINE W/O & W/DYE: CPT

## 2024-04-16 PROCEDURE — 85025 COMPLETE CBC W/AUTO DIFF WBC: CPT

## 2024-04-16 PROCEDURE — 83605 ASSAY OF LACTIC ACID: CPT

## 2024-04-16 PROCEDURE — 6360000004 HC RX CONTRAST MEDICATION: Performed by: NURSE PRACTITIONER

## 2024-04-16 PROCEDURE — 87040 BLOOD CULTURE FOR BACTERIA: CPT

## 2024-04-16 PROCEDURE — 85652 RBC SED RATE AUTOMATED: CPT

## 2024-04-16 PROCEDURE — 87150 DNA/RNA AMPLIFIED PROBE: CPT

## 2024-04-16 RX ORDER — METHOCARBAMOL 750 MG/1
750 TABLET, FILM COATED ORAL 3 TIMES DAILY
Qty: 30 TABLET | Refills: 0 | Status: SHIPPED | OUTPATIENT
Start: 2024-04-16 | End: 2024-04-26

## 2024-04-16 RX ORDER — KETOROLAC TROMETHAMINE 15 MG/ML
15 INJECTION, SOLUTION INTRAMUSCULAR; INTRAVENOUS ONCE
Status: COMPLETED | OUTPATIENT
Start: 2024-04-16 | End: 2024-04-16

## 2024-04-16 RX ORDER — PREDNISONE 20 MG/1
TABLET ORAL
Qty: 18 TABLET | Refills: 0 | Status: SHIPPED | OUTPATIENT
Start: 2024-04-16 | End: 2024-04-26

## 2024-04-16 RX ORDER — NALOXONE HYDROCHLORIDE 4 MG/.1ML
1 SPRAY NASAL PRN
Qty: 2 EACH | Refills: 1 | Status: SHIPPED | OUTPATIENT
Start: 2024-04-16

## 2024-04-16 RX ORDER — IBUPROFEN 800 MG/1
800 TABLET ORAL EVERY 8 HOURS PRN
Qty: 20 TABLET | Refills: 0 | Status: SHIPPED | OUTPATIENT
Start: 2024-04-16

## 2024-04-16 RX ORDER — ACETAMINOPHEN 500 MG
1000 TABLET ORAL 3 TIMES DAILY PRN
Qty: 180 TABLET | Refills: 0 | Status: SHIPPED | OUTPATIENT
Start: 2024-04-16

## 2024-04-16 RX ADMIN — GADOTERIDOL 13 ML: 279.3 INJECTION, SOLUTION INTRAVENOUS at 15:39

## 2024-04-16 RX ADMIN — METHOCARBAMOL 1000 MG: 1000 INJECTION, SOLUTION INTRAMUSCULAR; INTRAVENOUS at 16:58

## 2024-04-16 RX ADMIN — KETOROLAC TROMETHAMINE 15 MG: 15 INJECTION, SOLUTION INTRAMUSCULAR; INTRAVENOUS at 15:04

## 2024-04-16 ASSESSMENT — LIFESTYLE VARIABLES
HOW OFTEN DO YOU HAVE A DRINK CONTAINING ALCOHOL: NEVER
HOW MANY STANDARD DRINKS CONTAINING ALCOHOL DO YOU HAVE ON A TYPICAL DAY: PATIENT DOES NOT DRINK

## 2024-04-16 ASSESSMENT — PAIN DESCRIPTION - PAIN TYPE: TYPE: ACUTE PAIN

## 2024-04-16 ASSESSMENT — PAIN DESCRIPTION - ORIENTATION: ORIENTATION: LEFT;RIGHT

## 2024-04-16 ASSESSMENT — PAIN DESCRIPTION - FREQUENCY: FREQUENCY: CONTINUOUS

## 2024-04-16 ASSESSMENT — PAIN DESCRIPTION - ONSET: ONSET: ON-GOING

## 2024-04-16 ASSESSMENT — PAIN DESCRIPTION - DESCRIPTORS: DESCRIPTORS: ACHING;THROBBING;TIGHTNESS;SHARP

## 2024-04-16 ASSESSMENT — PAIN SCALES - GENERAL: PAINLEVEL_OUTOF10: 8

## 2024-04-16 ASSESSMENT — PAIN DESCRIPTION - LOCATION: LOCATION: LEG

## 2024-04-16 NOTE — ED PROVIDER NOTES
Emergency Department  [address] [facility] [address]  [phone]    As needed, If symptoms worsen    [facility]  [address]  [address]  [facility] [id number] [phone]  Schedule an appointment as soon as possible for a visit   Neuro spine referral for bulging disc      DISCHARGE MEDICATIONS:  New Prescriptions    METHOCARBAMOL (ROBAXIN-750) 750 MG TABLET    Take 1 tablet by mouth 3 times daily for 10 days    NALOXONE 4 MG/0.1ML LIQD NASAL SPRAY    1 spray by Nasal route as needed for Opioid Reversal    PREDNISONE (DELTASONE) 20 MG TABLET    3 tabs po qam for 3 days then 2 tabs qam for 3 days the 1 tab qam for 3 days       (Please note that portions of this note were completed with a voice recognition program.  Efforts were made to edit the dictations but occasionally words are mis-transcribed.)    Dede Forbes, CARY - CNP     This dictation was performed with a verbal recognition program (DRAGON) and it was checked for errors. It is possible that there are still dictated errors within this office note. If so, please bring any errors to my attention for an addendum. All efforts were made to ensure that this office note is accurate.       Dede Forbes, APRN - CNP  04/16/24 [id number]

## 2024-04-16 NOTE — PLAN OF CARE
Paged regarding Onesimo Cullen. 34 yo with active IVDU and fentanyl abuse who presents with low back pain. No weakness or bowel/bladder symptoms. Imaging shows multilevel lumbar spondylosis and degenerative disc disease with disc herniation at the right L5-S1. No signs of epidural abscess on imaging.     Recommend conservative therapy for now (NSAIDs, antispasmodics, physical therapy) and outpatient follow up. If he develops new or worrisome neurological symptoms or signs/symptoms of epidural abscess he should return immediately to the ED.     Call with questions or concerns.     Cayetano Guillen  Neurosurgeon  [facility]  [phone]  5:18 PM

## 2024-04-16 NOTE — ED TRIAGE NOTES
Patient arrived to the ED ambulatory from home w/ complaints of lower back pain.     Patient/EMS reports states Onset x4 days w/ pain going down bilateral legs down to the ankles, reports right now the right side is slightly more severe; patient reports yesterday morning around 0800 when he got up pain was more severe in the back. Reports it was crippling and today it is slightly better now that he has been able to stretch following a car ride here but still very severe. Denies any injuries or falls to the back. 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

## 2024-04-17 LAB — REPORT: NORMAL

## 2024-04-17 NOTE — PROGRESS NOTES
Attempted to call patient to inform them of positive blood cultures.  Nobody answered phone I did leave a message.

## 2024-04-19 LAB
BACTERIA BLD CULT ORG #2: ABNORMAL
BACTERIA BLD CULT ORG #2: ABNORMAL
BACTERIA BLD CULT: ABNORMAL
ORGANISM: ABNORMAL

## 2024-04-20 NOTE — RESULT ENCOUNTER NOTE
Patient's positive result has been appropriately evaluated by the provider pool.   Patient was unable to be reached over the phone.  A voicemail was left with the patient's grandmother.  Will await a return phone call.  Will try to reach patient again tomorrow per protocol.

## 2024-04-24 ENCOUNTER — HOSPITAL ENCOUNTER (EMERGENCY)
Age: 34
Discharge: LEFT BEFORE DISCHARGE | End: 2024-04-25
Attending: STUDENT IN AN ORGANIZED HEALTH CARE EDUCATION/TRAINING PROGRAM
Payer: MEDICAID

## 2024-04-24 DIAGNOSIS — A41.9 SEPTICEMIA (HCC): Primary | ICD-10-CM

## 2024-04-24 PROCEDURE — 96375 TX/PRO/DX INJ NEW DRUG ADDON: CPT

## 2024-04-24 PROCEDURE — 96372 THER/PROPH/DIAG INJ SC/IM: CPT

## 2024-04-24 PROCEDURE — 99285 EMERGENCY DEPT VISIT HI MDM: CPT

## 2024-04-24 PROCEDURE — 96365 THER/PROPH/DIAG IV INF INIT: CPT

## 2024-04-24 PROCEDURE — 96367 TX/PROPH/DG ADDL SEQ IV INF: CPT

## 2024-04-24 PROCEDURE — 96376 TX/PRO/DX INJ SAME DRUG ADON: CPT

## 2024-04-24 RX ORDER — 0.9 % SODIUM CHLORIDE 0.9 %
1000 INTRAVENOUS SOLUTION INTRAVENOUS ONCE
Status: COMPLETED | OUTPATIENT
Start: 2024-04-25 | End: 2024-04-25

## 2024-04-24 RX ORDER — ORPHENADRINE CITRATE 30 MG/ML
60 INJECTION INTRAMUSCULAR; INTRAVENOUS ONCE
Status: COMPLETED | OUTPATIENT
Start: 2024-04-25 | End: 2024-04-25

## 2024-04-24 RX ORDER — MORPHINE SULFATE 2 MG/ML
2 INJECTION, SOLUTION INTRAMUSCULAR; INTRAVENOUS ONCE
Status: COMPLETED | OUTPATIENT
Start: 2024-04-25 | End: 2024-04-25

## 2024-04-24 ASSESSMENT — PAIN DESCRIPTION - LOCATION: LOCATION: BACK

## 2024-04-24 ASSESSMENT — ENCOUNTER SYMPTOMS
BACK PAIN: 1
EYE PAIN: 0
VOMITING: 0
NAUSEA: 0
SHORTNESS OF BREATH: 0
ABDOMINAL PAIN: 0
SORE THROAT: 0
COUGH: 0

## 2024-04-24 ASSESSMENT — PAIN - FUNCTIONAL ASSESSMENT: PAIN_FUNCTIONAL_ASSESSMENT: 0-10

## 2024-04-24 ASSESSMENT — LIFESTYLE VARIABLES
HOW OFTEN DO YOU HAVE A DRINK CONTAINING ALCOHOL: PATIENT DECLINED
HOW MANY STANDARD DRINKS CONTAINING ALCOHOL DO YOU HAVE ON A TYPICAL DAY: PATIENT DECLINED

## 2024-04-24 ASSESSMENT — PAIN SCALES - GENERAL: PAINLEVEL_OUTOF10: 9

## 2024-04-25 ENCOUNTER — APPOINTMENT (OUTPATIENT)
Dept: MRI IMAGING | Age: 34
End: 2024-04-25
Payer: MEDICAID

## 2024-04-25 ENCOUNTER — APPOINTMENT (OUTPATIENT)
Dept: GENERAL RADIOLOGY | Age: 34
End: 2024-04-25
Payer: MEDICAID

## 2024-04-25 ENCOUNTER — HOSPITAL ENCOUNTER (EMERGENCY)
Age: 34
Discharge: LEFT BEFORE DISCHARGE | End: 2024-04-25
Payer: MEDICAID

## 2024-04-25 VITALS
HEIGHT: 73 IN | WEIGHT: 156.75 LBS | TEMPERATURE: 98.3 F | RESPIRATION RATE: 14 BRPM | HEART RATE: 109 BPM | OXYGEN SATURATION: 96 % | SYSTOLIC BLOOD PRESSURE: 134 MMHG | BODY MASS INDEX: 20.77 KG/M2 | DIASTOLIC BLOOD PRESSURE: 69 MMHG

## 2024-04-25 VITALS
OXYGEN SATURATION: 95 % | BODY MASS INDEX: 20.19 KG/M2 | RESPIRATION RATE: 16 BRPM | WEIGHT: 153 LBS | SYSTOLIC BLOOD PRESSURE: 121 MMHG | HEART RATE: 91 BPM | TEMPERATURE: 97 F | DIASTOLIC BLOOD PRESSURE: 77 MMHG

## 2024-04-25 DIAGNOSIS — M54.50 ACUTE LOW BACK PAIN, UNSPECIFIED BACK PAIN LATERALITY, UNSPECIFIED WHETHER SCIATICA PRESENT: Primary | ICD-10-CM

## 2024-04-25 DIAGNOSIS — R78.81 POSITIVE BLOOD CULTURE: ICD-10-CM

## 2024-04-25 LAB
ALBUMIN SERPL-MCNC: 3.9 G/DL (ref 3.4–5)
ALBUMIN/GLOB SERPL: 1 {RATIO} (ref 1.1–2.2)
ALP SERPL-CCNC: 64 U/L (ref 40–129)
ALT SERPL-CCNC: 15 U/L (ref 10–40)
AMPHETAMINES UR QL SCN>1000 NG/ML: POSITIVE
ANION GAP SERPL CALCULATED.3IONS-SCNC: 12 MMOL/L (ref 3–16)
AST SERPL-CCNC: 13 U/L (ref 15–37)
BACTERIA URNS QL MICRO: NORMAL /HPF
BARBITURATES UR QL SCN>200 NG/ML: ABNORMAL
BASOPHILS # BLD: 0 K/UL (ref 0–0.2)
BASOPHILS NFR BLD: 0 %
BENZODIAZ UR QL SCN>200 NG/ML: ABNORMAL
BILIRUB SERPL-MCNC: <0.2 MG/DL (ref 0–1)
BILIRUB UR QL STRIP.AUTO: NEGATIVE
BUN SERPL-MCNC: 16 MG/DL (ref 7–20)
CALCIUM SERPL-MCNC: 9.2 MG/DL (ref 8.3–10.6)
CANNABINOIDS UR QL SCN>50 NG/ML: ABNORMAL
CHLORIDE SERPL-SCNC: 96 MMOL/L (ref 99–110)
CLARITY UR: CLEAR
CO2 SERPL-SCNC: 25 MMOL/L (ref 21–32)
COCAINE UR QL SCN: ABNORMAL
COLOR UR: YELLOW
CREAT SERPL-MCNC: 0.8 MG/DL (ref 0.9–1.3)
CRP SERPL-MCNC: 123.4 MG/L (ref 0–5.1)
DEPRECATED RDW RBC AUTO: 16.9 % (ref 12.4–15.4)
DRUG SCREEN COMMENT UR-IMP: ABNORMAL
EKG ATRIAL RATE: 123 BPM
EKG DIAGNOSIS: NORMAL
EKG P AXIS: 79 DEGREES
EKG P-R INTERVAL: 116 MS
EKG Q-T INTERVAL: 312 MS
EKG QRS DURATION: 86 MS
EKG QTC CALCULATION (BAZETT): 446 MS
EKG R AXIS: 86 DEGREES
EKG T AXIS: 66 DEGREES
EKG VENTRICULAR RATE: 123 BPM
EOSINOPHIL # BLD: 0 K/UL (ref 0–0.6)
EOSINOPHIL NFR BLD: 0 %
EPI CELLS #/AREA URNS AUTO: 0 /HPF (ref 0–5)
ERYTHROCYTE [SEDIMENTATION RATE] IN BLOOD BY WESTERGREN METHOD: 65 MM/HR (ref 0–15)
FENTANYL SCREEN, URINE: POSITIVE
GFR SERPLBLD CREATININE-BSD FMLA CKD-EPI: >90 ML/MIN/{1.73_M2}
GLUCOSE SERPL-MCNC: 112 MG/DL (ref 70–99)
GLUCOSE UR STRIP.AUTO-MCNC: NEGATIVE MG/DL
HCT VFR BLD AUTO: 32.5 % (ref 40.5–52.5)
HGB BLD-MCNC: 10.5 G/DL (ref 13.5–17.5)
HGB UR QL STRIP.AUTO: NEGATIVE
HYALINE CASTS #/AREA URNS AUTO: 2 /LPF (ref 0–8)
KETONES UR STRIP.AUTO-MCNC: NEGATIVE MG/DL
LACTATE BLDV-SCNC: 1.1 MMOL/L (ref 0.4–2)
LEUKOCYTE ESTERASE UR QL STRIP.AUTO: NEGATIVE
LYMPHOCYTES # BLD: 2.1 K/UL (ref 1–5.1)
LYMPHOCYTES NFR BLD: 12 %
MCH RBC QN AUTO: 26.1 PG (ref 26–34)
MCHC RBC AUTO-ENTMCNC: 32.4 G/DL (ref 31–36)
MCV RBC AUTO: 80.5 FL (ref 80–100)
METHADONE UR QL SCN>300 NG/ML: ABNORMAL
MONOCYTES # BLD: 0.7 K/UL (ref 0–1.3)
MONOCYTES NFR BLD: 5 %
NEUTROPHILS # BLD: 12 K/UL (ref 1.7–7.7)
NEUTROPHILS NFR BLD: 80 %
NEUTS BAND NFR BLD MANUAL: 1 % (ref 0–7)
NITRITE UR QL STRIP.AUTO: NEGATIVE
NT-PROBNP SERPL-MCNC: 141 PG/ML (ref 0–124)
OPIATES UR QL SCN>300 NG/ML: POSITIVE
OXYCODONE UR QL SCN: ABNORMAL
PCP UR QL SCN>25 NG/ML: ABNORMAL
PH UR STRIP.AUTO: 8.5 [PH] (ref 5–8)
PH UR STRIP: 8.5 [PH]
PLATELET # BLD AUTO: 427 K/UL (ref 135–450)
PMV BLD AUTO: 6.2 FL (ref 5–10.5)
POTASSIUM SERPL-SCNC: 4.2 MMOL/L (ref 3.5–5.1)
PROT SERPL-MCNC: 7.7 G/DL (ref 6.4–8.2)
PROT UR STRIP.AUTO-MCNC: 30 MG/DL
RBC # BLD AUTO: 4.04 M/UL (ref 4.2–5.9)
RBC CLUMPS #/AREA URNS AUTO: 2 /HPF (ref 0–4)
SODIUM SERPL-SCNC: 133 MMOL/L (ref 136–145)
SP GR UR STRIP.AUTO: 1.02 (ref 1–1.03)
TROPONIN, HIGH SENSITIVITY: 6 NG/L (ref 0–22)
TROPONIN, HIGH SENSITIVITY: <6 NG/L (ref 0–22)
UA COMPLETE W REFLEX CULTURE PNL UR: ABNORMAL
UA DIPSTICK W REFLEX MICRO PNL UR: YES
URN SPEC COLLECT METH UR: ABNORMAL
UROBILINOGEN UR STRIP-ACNC: 1 E.U./DL
VARIANT LYMPHS NFR BLD MANUAL: 2 % (ref 0–6)
WBC # BLD AUTO: 14.8 K/UL (ref 4–11)
WBC #/AREA URNS AUTO: 0 /HPF (ref 0–5)

## 2024-04-25 PROCEDURE — 96375 TX/PRO/DX INJ NEW DRUG ADDON: CPT

## 2024-04-25 PROCEDURE — 84484 ASSAY OF TROPONIN QUANT: CPT

## 2024-04-25 PROCEDURE — 71046 X-RAY EXAM CHEST 2 VIEWS: CPT

## 2024-04-25 PROCEDURE — 87040 BLOOD CULTURE FOR BACTERIA: CPT

## 2024-04-25 PROCEDURE — 36415 COLL VENOUS BLD VENIPUNCTURE: CPT

## 2024-04-25 PROCEDURE — 86140 C-REACTIVE PROTEIN: CPT

## 2024-04-25 PROCEDURE — 99281 EMR DPT VST MAYX REQ PHY/QHP: CPT

## 2024-04-25 PROCEDURE — 83880 ASSAY OF NATRIURETIC PEPTIDE: CPT

## 2024-04-25 PROCEDURE — 96367 TX/PROPH/DG ADDL SEQ IV INF: CPT

## 2024-04-25 PROCEDURE — 83605 ASSAY OF LACTIC ACID: CPT

## 2024-04-25 PROCEDURE — 96376 TX/PRO/DX INJ SAME DRUG ADON: CPT

## 2024-04-25 PROCEDURE — 85652 RBC SED RATE AUTOMATED: CPT

## 2024-04-25 PROCEDURE — 96365 THER/PROPH/DIAG IV INF INIT: CPT

## 2024-04-25 PROCEDURE — 6360000002 HC RX W HCPCS: Performed by: STUDENT IN AN ORGANIZED HEALTH CARE EDUCATION/TRAINING PROGRAM

## 2024-04-25 PROCEDURE — 85025 COMPLETE CBC W/AUTO DIFF WBC: CPT

## 2024-04-25 PROCEDURE — 2580000003 HC RX 258: Performed by: STUDENT IN AN ORGANIZED HEALTH CARE EDUCATION/TRAINING PROGRAM

## 2024-04-25 PROCEDURE — 6360000002 HC RX W HCPCS: Performed by: PHYSICIAN ASSISTANT

## 2024-04-25 PROCEDURE — 96372 THER/PROPH/DIAG INJ SC/IM: CPT

## 2024-04-25 PROCEDURE — 80307 DRUG TEST PRSMV CHEM ANLYZR: CPT

## 2024-04-25 PROCEDURE — 2580000003 HC RX 258: Performed by: PHYSICIAN ASSISTANT

## 2024-04-25 PROCEDURE — 80053 COMPREHEN METABOLIC PANEL: CPT

## 2024-04-25 PROCEDURE — 93005 ELECTROCARDIOGRAM TRACING: CPT | Performed by: STUDENT IN AN ORGANIZED HEALTH CARE EDUCATION/TRAINING PROGRAM

## 2024-04-25 PROCEDURE — 93010 ELECTROCARDIOGRAM REPORT: CPT | Performed by: INTERNAL MEDICINE

## 2024-04-25 PROCEDURE — 81003 URINALYSIS AUTO W/O SCOPE: CPT

## 2024-04-25 RX ORDER — MORPHINE SULFATE 4 MG/ML
4 INJECTION, SOLUTION INTRAMUSCULAR; INTRAVENOUS ONCE
Status: COMPLETED | OUTPATIENT
Start: 2024-04-25 | End: 2024-04-25

## 2024-04-25 RX ORDER — KETOROLAC TROMETHAMINE 15 MG/ML
15 INJECTION, SOLUTION INTRAMUSCULAR; INTRAVENOUS ONCE
Status: DISCONTINUED | OUTPATIENT
Start: 2024-04-25 | End: 2024-04-25

## 2024-04-25 RX ADMIN — MORPHINE SULFATE 4 MG: 4 INJECTION, SOLUTION INTRAMUSCULAR; INTRAVENOUS at 01:10

## 2024-04-25 RX ADMIN — CEFEPIME 2000 MG: 2 INJECTION, POWDER, FOR SOLUTION INTRAVENOUS at 00:41

## 2024-04-25 RX ADMIN — MORPHINE SULFATE 2 MG: 2 INJECTION, SOLUTION INTRAMUSCULAR; INTRAVENOUS at 00:28

## 2024-04-25 RX ADMIN — SODIUM CHLORIDE 1000 ML: 9 INJECTION, SOLUTION INTRAVENOUS at 00:39

## 2024-04-25 RX ADMIN — VANCOMYCIN HYDROCHLORIDE 1000 MG: 1 INJECTION, POWDER, LYOPHILIZED, FOR SOLUTION INTRAVENOUS at 01:25

## 2024-04-25 RX ADMIN — ORPHENADRINE CITRATE 60 MG: 60 INJECTION INTRAMUSCULAR; INTRAVENOUS at 00:29

## 2024-04-25 ASSESSMENT — LIFESTYLE VARIABLES
HOW MANY STANDARD DRINKS CONTAINING ALCOHOL DO YOU HAVE ON A TYPICAL DAY: 1 OR 2
HOW OFTEN DO YOU HAVE A DRINK CONTAINING ALCOHOL: MONTHLY OR LESS

## 2024-04-25 ASSESSMENT — PAIN SCALES - GENERAL
PAINLEVEL_OUTOF10: 9

## 2024-04-25 ASSESSMENT — PAIN DESCRIPTION - LOCATION: LOCATION: BACK

## 2024-04-25 NOTE — ED NOTES
Followed up with Onesimo Cullen on 4/25/2024 at 2:23 AM. Patient left the ED with a disposition of AMA on . Patient cited personal obligations as reason. Advised patient to follow up with a primary care physician or return to the Emergency Department if symptoms worsen.   Adelaide Pinzon RN

## 2024-04-25 NOTE — ED PROVIDER NOTES
drug use, MRSA bacteremia (10/28/2020), and MRSA infection (10/28/2020).     EMERGENCY DEPARTMENT COURSE and DIFFERENTIAL DIAGNOSIS/MDM:   Vitals:    Vitals:    04/25/24 1345 04/25/24 1359   BP: 121/77    Pulse: 91    Resp: 16    Temp: 97 °F (36.1 °C)    TempSrc: Oral    SpO2: 95%    Weight:  69.4 kg (153 lb)       Patient was given the following medications:  Medications - No data to display          Patient was nontoxic, well appearing, with normal vital signs .  Presents for low back pain and positive blood cultures.  Seen yesterday and left AMA.  Agreeable to stay today for MRIs and admission.  Hx IVDU.  On exam, here is no TTP of the thoracic or lumbar midline spine,  lower extremity neuro exam is normal.  Ordered MRIs, labs, meds.    Records reviewed:  - reviewed ED visit from 4/16/24- seen for back pain.  Blood culture for Streptococcus and diphtheroids.  Had MRI lumbar spine w and wo contrast which showed degenerative changes but no infections .  ESR and CRP were significantly elevated.  Neurosurgery consulted and reviewed MRI - agreed with findings and ok with outpatient treatment.     -Also reviewed ED note from visit yesterday - patient returned after being called about positive blood cultures.  Also complaining of low back pain. Noted to also have tachycardia and new heart murmur so high suspicion for endocarditis.  Also concern for spinal epidural abscess.  Given antibiotics and MRI tech called in for the MRIs, but patient left AMA prior to them being obtained     Differential diagnosis includes spinal epidural abscess, osteomyelitis, sepsis, endocarditis, other     I strongly discussed with patient during my initial evaluation  the need and importance for the workup, MRIs, and antibiotics.  Discussed concern for both epidural abscess and endocarditis. Discussed he will need admission regardless of what is found.  Discussed that if he does not allow this to be treated, he will get sicker, could have  paralysis/disability or even death.  He understood and was in agreement with plan of care.       I called MRI to notify them of orders and they can take patient now for the studies.      RN went in to start patient's IV and get bloodwork.  Patient told her that he wanted to leave and did not feel comfortable here.  I went back to his room and discussed with him that if he left, he would have to sign out AMA.  Discussed with him again my concern about infections, need for MRIs and admission. I told him MRI was ready for him now and I had placed all the orders.  He still wants to leave.  Says he does not feel comfortable here.  Discussed he would have to sign out AMA.  Discussed that his condition could worsen, he could have permanent disability or even death.  He understood.  He was in the appropriate mental capacity to make this decision.  I notified him that he can return at any time for treatment.  He understood.      Social Determinants : drug use    Chronic Conditions: drug use      I am the Primary Clinician of Record.    Is this patient to be included in the SEP-1 Core Measure due to severe sepsis or septic shock?   No   Exclusion criteria - the patient is NOT to be included for SEP-1 Core Measure due to:  2+ SIRS criteria are not met    PROCEDURES   Unless otherwise noted below, none     Procedures    FINAL IMPRESSION      1. Acute low back pain, unspecified back pain laterality, unspecified whether sciatica present    2. Positive blood culture          DISPOSITION/PLAN       DISPOSITION Edroy 04/25/2024 03:46:35 PM      PATIENT REFERRED TO:  No follow-up provider specified.    DISCHARGE MEDICATIONS:  Discharge Medication List as of 4/25/2024  3:51 PM          DISCONTINUED MEDICATIONS:  Discharge Medication List as of 4/25/2024  3:51 PM                 (Please note that portions of this note were completed with a voice recognition program.  Efforts were made to edit the dictations but occasionally words are

## 2024-04-25 NOTE — ED PROVIDER NOTES
ED Attending Attestation Note    This patient was seen by the advanced practice provider.     I personally saw the patient and made/approved the management plan and take responsibility for the patient management.    History:   Briefly, 33 y.o. male presents with positive blood cultures that were obtained in a prior hospital visit.  He has a history of IV drug abuse he is complaining of lower back pain.  She is here a few days ago.       Physical Exam  Vitals and nursing note reviewed.   Constitutional:       Appearance: He is not toxic-appearing or diaphoretic.   HENT:      Head: Normocephalic and atraumatic.      Right Ear: External ear normal.      Left Ear: External ear normal.      Nose: Nose normal.   Eyes:      Conjunctiva/sclera: Conjunctivae normal.   Cardiovascular:      Rate and Rhythm: Regular rhythm. Tachycardia present.      Pulses: Normal pulses.      Heart sounds: Murmur heard.   Pulmonary:      Effort: Pulmonary effort is normal. No respiratory distress.      Breath sounds: No wheezing or rales.   Musculoskeletal:      Right lower leg: No edema.      Left lower leg: No edema.   Skin:     General: Skin is warm and dry.      Capillary Refill: Capillary refill takes less than 2 seconds.   Neurological:      Mental Status: He is alert.            ED Course as of 04/25/24 0220   Thu Apr 25, 2024   0104 Sed Rate(!): 65  MRI ordered [AL]   0124 Opiate Scrn, Ur(!): POSITIVE [AL]   0124 Amphetamine Screen, Urine(!): POSITIVE [AL]   0124 FENTANYL SCREEN, URINE(!): POSITIVE [AL]   0157 Troponin, High Sensitivity: <6 [AL]      ED Course User Index  [AL] Arley Silva MD         I independently interpreted the following study(s) ecg which show Sinus tachycardia ventricular 123 [place] interval 116, QRS 86, QTc 446, normal axis, no clinically significant ST segment elevation or depression, increased amplitude may be secondary to patient's decreased body habitus.  No significant T wave abdnormality.      MDM:  decompensation.      For further details of the patient's emergency department visit, please see the advanced practice provider's documentation.    Arley Silva MD     This report has been produced using speech recognition software and may contain errors related to that system including errors in grammar, punctuation, and spelling, as well as words and phrases that may be inappropriate. If there are any questions or concerns please feel free to contact the dictating provider for clarification.           Arley Silva MD  04/25/24 9556

## 2024-04-25 NOTE — ED PROVIDER NOTES
[facility] EMERGENCY DEPARTMENT  EMERGENCY DEPARTMENT ENCOUNTER      Pt Name: Onesimo Cullen  MRN:[id number]  Birthdate 1990  Date of evaluation: 4/24/2024  Provider: Davey Quijano PA-C  Note Started: 11:32 PM EDT 4/24/24    This patient was seen evaluated by attending physician Dr. Whit Silva MD        Chief Complaint:    Chief Complaint   Patient presents with    Back Pain     Pt c/o back pain and blood infection . Pt states hospital called him and told him he needs to come back and get antibiotics.     Blood Infection         Nursing Notes, Past Medical Hx, Past Surgical Hx, Social Hx, Allergies, and Family Hx were all reviewed and agreed with or any disagreements were addressed in the HPI.    HPI: (Location, Duration, Timing, Severity, Quality, Assoc Sx, Context, Modifying factors)    History From: Patient  Limitations to history : None    Social Determinants Significantly Affecting Health : None    Chief Complaint of low back pain.  Patient complaining of worsening low back pain.  He states that he was call to come back to the emergency room because his blood cultures were positive.  He was seen him April to 16th and had blood cultures due to his back pain and he is a heroin and fentanyl user..  He denies any fever, no shortness of breath, says he was given steroid, muscle relaxer and nothing helped with his pain.  No new injuries.Denies numbness or tingling his feet or fingers.  No other complaints.  History of hepatitis C    This is a  33 y.o. male who presents to the emergency room with the above complaint    PastMedical/Surgical History:      Diagnosis Date    Hepatitis C     Illicit drug use     MRSA bacteremia 10/28/2020    MRSA infection 10/28/2020    abscess face         Procedure Laterality Date    FRACTURE SURGERY      Right wrist, L collar bone       Medications:  Previous Medications    ACETAMINOPHEN (TYLENOL) 500 MG TABLET    Take 2 tablets by mouth 3 times

## 2024-04-25 NOTE — ED TRIAGE NOTES
Pt c/o back pain and blood infection . Pt states hospital called him and told him he needs to come back and get antibiotics

## 2024-04-25 NOTE — PROGRESS NOTES
Cefepime dose changed to 2 gm per protocol  Corina Kaiser, [facility],4/24/2024,11:58 PM

## 2024-04-26 LAB
BACTERIA BLD CULT ORG #2: NORMAL
BACTERIA BLD CULT: NORMAL

## 2024-04-29 LAB
BACTERIA BLD CULT ORG #2: NORMAL
BACTERIA BLD CULT: NORMAL

## 2025-01-31 NOTE — ED NOTES
Call received from lab with positive blood culture results. Results handed to Dr. Sanford SANDOVAL.   
Patient to MRI  
Pt called, but no answer.  Left a message for the patient to come back to the hospital.  
Lactate - 3.3

## 2025-03-24 NOTE — CARE COORDINATION
"Daily Note     Today's date: 3/24/2025  Patient name: Matthew Simeon  : 1985  MRN: [id number]  Referring provider: Vitor Gunderson,*  Dx:   Encounter Diagnosis     ICD-10-CM    1. Status post open reduction with internal fixation (ORIF) of fracture of ankle  Z98.890     Z87.81                        Subjective: \" I had a little pain in the incision yesterday- I am full weightbearing now  and I walked 4 blocks and my calf was a little sore\"       Objective: See treatment diary below      Assessment: Matthew presents with ankle pain s/p ORIF. Progressed WB exercises to tolerance with increased focus on mobility. Continues with increased achilles restrictions mild improvement post. Fatigue noted with ankle activation with mild shaking. Will progress standing exercises without CAM nv pending how patient feels post todays visit. Progress HEP NV. Tolerated session without adverse effects. Recommend continued skilled therapy to improve overall strength and mobility for functional return with decreased compensation and pain.        Plan: Continue per plan of care.  Progress treatment as tolerated.       Precautions: major depressive disorder, s/p ORIF     DOS 25      Date 3/3 3/10 3/12 3/24         Visit # IE 2 3 4         FOTO IE             Re-eval IE                 Manuals 3/3 3/10 3/12 3/24         PROM to pierce   Gentle inv/ev GF           IASTM             Edema massage  KR             HS str    30\" x 3           Neuro Re-Ed 3/3 3/10 3/12 3/24         BAPS board     L3 x 20 CW/ CCW x 20          Towel scrunches   2x20 2 x 20  2x20          HS curl  3x10  3 x 10  Standing 3 x 10          LAQ  3x10 3\" 3\" 3 x 10           Sidestepping     X 5 laps          Standing hip abd     2x10          Squats     2x10          Ther Ex 3/3 3/10 3/12 3/24         Ankle AROM (HEP) 3x10  20x ea X 30 ea          SLR 3 ways (HEP) 3x10  3x10 ea 3 x 10 ea          Gastroc stretch (HEP) 3x30\"  3x30\" 30\" x 3  Slant board 3 x " Will dc on po zyvox 600mg bix x 30 days, rx to retail. Will assist w/ PA if needed. Electronically signed by Myles Abrams RN on 6/4/2019 at 12:37 PM      Per My Query in pharmacy, zyvox covered at 100% she will seliver when ready  Pt requested info and assistance w/ drug rehab  States that he has not seen Dr Jona Guerrero in some years but needs to be reestablished. Sched appt for this Thursday.     Electronically signed by Myles Abrams RN on 6/4/2019 at 2:26 PM "30\"          Hamstring stretch    30\" x 3           HR/ TR    Seated x 20 ea 15#         Pro stretch     Seated 3 x 30\"         LP    nv                      Ther Activity 3/3 3/10 3/12 3/24         Bike     5' ROM          Patient education  KR  ND GF KR                                   foto             Modalities 3/3 3/10 3/12          CP prn                                "

## 2025-05-02 ENCOUNTER — APPOINTMENT (OUTPATIENT)
Dept: CT IMAGING | Age: 35
DRG: 720 | End: 2025-05-02
Payer: MEDICAID

## 2025-05-02 ENCOUNTER — ANESTHESIA (OUTPATIENT)
Dept: OPERATING ROOM | Age: 35
DRG: 720 | End: 2025-05-02
Payer: MEDICAID

## 2025-05-02 ENCOUNTER — HOSPITAL ENCOUNTER (INPATIENT)
Age: 35
LOS: 2 days | Discharge: LEFT BEFORE DISCHARGE | DRG: 720 | End: 2025-05-04
Attending: ORTHOPAEDIC SURGERY | Admitting: ORTHOPAEDIC SURGERY
Payer: MEDICAID

## 2025-05-02 ENCOUNTER — ANESTHESIA EVENT (OUTPATIENT)
Dept: OPERATING ROOM | Age: 35
DRG: 720 | End: 2025-05-02
Payer: MEDICAID

## 2025-05-02 ENCOUNTER — ANESTHESIA (OUTPATIENT)
Dept: CT IMAGING | Age: 35
End: 2025-05-02
Payer: MEDICAID

## 2025-05-02 ENCOUNTER — ANESTHESIA EVENT (OUTPATIENT)
Dept: CT IMAGING | Age: 35
End: 2025-05-02
Payer: MEDICAID

## 2025-05-02 ENCOUNTER — APPOINTMENT (OUTPATIENT)
Dept: GENERAL RADIOLOGY | Age: 35
DRG: 720 | End: 2025-05-02
Payer: MEDICAID

## 2025-05-02 DIAGNOSIS — L02.414 ABSCESS OF LEFT UPPER EXTREMITY: ICD-10-CM

## 2025-05-02 DIAGNOSIS — T79.A12A TRAUMATIC COMPARTMENT SYNDROME OF LEFT UPPER EXTREMITY, INITIAL ENCOUNTER: ICD-10-CM

## 2025-05-02 DIAGNOSIS — F19.90 IVDU (INTRAVENOUS DRUG USER): ICD-10-CM

## 2025-05-02 DIAGNOSIS — F11.29 OPIOID DEPENDENCE WITH OPIOID-INDUCED DISORDER (HCC): ICD-10-CM

## 2025-05-02 DIAGNOSIS — L03.114 CELLULITIS OF LEFT UPPER EXTREMITY: Primary | ICD-10-CM

## 2025-05-02 PROBLEM — Z98.890 STATUS POST INCISION AND DRAINAGE: Status: ACTIVE | Noted: 2025-05-02

## 2025-05-02 LAB
ALBUMIN SERPL-MCNC: 3.6 G/DL (ref 3.4–5)
ALBUMIN/GLOB SERPL: 1 {RATIO} (ref 1.1–2.2)
ALP SERPL-CCNC: 116 U/L (ref 40–129)
ALT SERPL-CCNC: 29 U/L (ref 10–40)
ANION GAP SERPL CALCULATED.3IONS-SCNC: 12 MMOL/L (ref 3–16)
AST SERPL-CCNC: 37 U/L (ref 15–37)
BASOPHILS # BLD: 0 K/UL (ref 0–0.2)
BASOPHILS NFR BLD: 0.1 %
BILIRUB SERPL-MCNC: 0.4 MG/DL (ref 0–1)
BUN SERPL-MCNC: 13 MG/DL (ref 7–20)
CALCIUM SERPL-MCNC: 8.9 MG/DL (ref 8.3–10.6)
CHLORIDE SERPL-SCNC: 93 MMOL/L (ref 99–110)
CO2 SERPL-SCNC: 24 MMOL/L (ref 21–32)
CREAT SERPL-MCNC: 0.6 MG/DL (ref 0.9–1.3)
DEPRECATED RDW RBC AUTO: 13.5 % (ref 12.4–15.4)
EOSINOPHIL # BLD: 0 K/UL (ref 0–0.6)
EOSINOPHIL NFR BLD: 0.1 %
GFR SERPLBLD CREATININE-BSD FMLA CKD-EPI: >90 ML/MIN/{1.73_M2}
GLUCOSE SERPL-MCNC: 91 MG/DL (ref 70–99)
HCT VFR BLD AUTO: 29.7 % (ref 40.5–52.5)
HGB BLD-MCNC: 9.9 G/DL (ref 13.5–17.5)
LACTATE BLDV-SCNC: 2 MMOL/L (ref 0.4–1.9)
LYMPHOCYTES # BLD: 1.4 K/UL (ref 1–5.1)
LYMPHOCYTES NFR BLD: 6.5 %
MAGNESIUM SERPL-MCNC: 1.86 MG/DL (ref 1.8–2.4)
MCH RBC QN AUTO: 28.5 PG (ref 26–34)
MCHC RBC AUTO-ENTMCNC: 33.5 G/DL (ref 31–36)
MCV RBC AUTO: 85.1 FL (ref 80–100)
MONOCYTES # BLD: 1.5 K/UL (ref 0–1.3)
MONOCYTES NFR BLD: 7.1 %
NEUTROPHILS # BLD: 18.2 K/UL (ref 1.7–7.7)
NEUTROPHILS NFR BLD: 86.2 %
PLATELET # BLD AUTO: 373 K/UL (ref 135–450)
PMV BLD AUTO: 7.3 FL (ref 5–10.5)
POTASSIUM SERPL-SCNC: 3.3 MMOL/L (ref 3.5–5.1)
PROT SERPL-MCNC: 7.2 G/DL (ref 6.4–8.2)
RBC # BLD AUTO: 3.48 M/UL (ref 4.2–5.9)
SODIUM SERPL-SCNC: 129 MMOL/L (ref 136–145)
WBC # BLD AUTO: 21.2 K/UL (ref 4–11)

## 2025-05-02 PROCEDURE — 73070 X-RAY EXAM OF ELBOW: CPT

## 2025-05-02 PROCEDURE — 2580000003 HC RX 258: Performed by: ANESTHESIOLOGY

## 2025-05-02 PROCEDURE — 36415 COLL VENOUS BLD VENIPUNCTURE: CPT

## 2025-05-02 PROCEDURE — 2580000003 HC RX 258: Performed by: NURSE PRACTITIONER

## 2025-05-02 PROCEDURE — 3700000001 HC ADD 15 MINUTES (ANESTHESIA): Performed by: ORTHOPAEDIC SURGERY

## 2025-05-02 PROCEDURE — 87205 SMEAR GRAM STAIN: CPT

## 2025-05-02 PROCEDURE — 3700000001 HC ADD 15 MINUTES (ANESTHESIA): Performed by: ANESTHESIOLOGY

## 2025-05-02 PROCEDURE — 87186 SC STD MICRODIL/AGAR DIL: CPT

## 2025-05-02 PROCEDURE — 6360000002 HC RX W HCPCS: Performed by: NURSE PRACTITIONER

## 2025-05-02 PROCEDURE — 93005 ELECTROCARDIOGRAM TRACING: CPT | Performed by: EMERGENCY MEDICINE

## 2025-05-02 PROCEDURE — 3700000000 HC ANESTHESIA ATTENDED CARE: Performed by: ORTHOPAEDIC SURGERY

## 2025-05-02 PROCEDURE — 3600000002 HC SURGERY LEVEL 2 BASE: Performed by: ORTHOPAEDIC SURGERY

## 2025-05-02 PROCEDURE — 85025 COMPLETE CBC W/AUTO DIFF WBC: CPT

## 2025-05-02 PROCEDURE — 6370000000 HC RX 637 (ALT 250 FOR IP): Performed by: NURSE PRACTITIONER

## 2025-05-02 PROCEDURE — 7100000001 HC PACU RECOVERY - ADDTL 15 MIN: Performed by: ORTHOPAEDIC SURGERY

## 2025-05-02 PROCEDURE — 6360000002 HC RX W HCPCS: Performed by: ORTHOPAEDIC SURGERY

## 2025-05-02 PROCEDURE — 96375 TX/PRO/DX INJ NEW DRUG ADDON: CPT

## 2025-05-02 PROCEDURE — 6370000000 HC RX 637 (ALT 250 FOR IP): Performed by: ORTHOPAEDIC SURGERY

## 2025-05-02 PROCEDURE — 3700000000 HC ANESTHESIA ATTENDED CARE: Performed by: ANESTHESIOLOGY

## 2025-05-02 PROCEDURE — 0JBF0ZZ EXCISION OF LEFT UPPER ARM SUBCUTANEOUS TISSUE AND FASCIA, OPEN APPROACH: ICD-10-PCS | Performed by: STUDENT IN AN ORGANIZED HEALTH CARE EDUCATION/TRAINING PROGRAM

## 2025-05-02 PROCEDURE — 1200000000 HC SEMI PRIVATE

## 2025-05-02 PROCEDURE — 2709999900 HC NON-CHARGEABLE SUPPLY: Performed by: ORTHOPAEDIC SURGERY

## 2025-05-02 PROCEDURE — 80053 COMPREHEN METABOLIC PANEL: CPT

## 2025-05-02 PROCEDURE — 96365 THER/PROPH/DIAG IV INF INIT: CPT

## 2025-05-02 PROCEDURE — 7100000000 HC PACU RECOVERY - FIRST 15 MIN: Performed by: ORTHOPAEDIC SURGERY

## 2025-05-02 PROCEDURE — 87040 BLOOD CULTURE FOR BACTERIA: CPT

## 2025-05-02 PROCEDURE — 2500000003 HC RX 250 WO HCPCS: Performed by: ORTHOPAEDIC SURGERY

## 2025-05-02 PROCEDURE — 83605 ASSAY OF LACTIC ACID: CPT

## 2025-05-02 PROCEDURE — 2580000003 HC RX 258: Performed by: ORTHOPAEDIC SURGERY

## 2025-05-02 PROCEDURE — 87077 CULTURE AEROBIC IDENTIFY: CPT

## 2025-05-02 PROCEDURE — 71046 X-RAY EXAM CHEST 2 VIEWS: CPT

## 2025-05-02 PROCEDURE — 6360000002 HC RX W HCPCS: Performed by: ANESTHESIOLOGY

## 2025-05-02 PROCEDURE — 3600000012 HC SURGERY LEVEL 2 ADDTL 15MIN: Performed by: ORTHOPAEDIC SURGERY

## 2025-05-02 PROCEDURE — 87075 CULTR BACTERIA EXCEPT BLOOD: CPT

## 2025-05-02 PROCEDURE — 83735 ASSAY OF MAGNESIUM: CPT

## 2025-05-02 PROCEDURE — 99285 EMERGENCY DEPT VISIT HI MDM: CPT

## 2025-05-02 PROCEDURE — 87070 CULTURE OTHR SPECIMN AEROBIC: CPT

## 2025-05-02 RX ORDER — PROPOFOL 10 MG/ML
INJECTION, EMULSION INTRAVENOUS
Status: DISCONTINUED | OUTPATIENT
Start: 2025-05-02 | End: 2025-05-02 | Stop reason: SDUPTHER

## 2025-05-02 RX ORDER — MAGNESIUM HYDROXIDE 1200 MG/15ML
LIQUID ORAL CONTINUOUS PRN
Status: DISCONTINUED | OUTPATIENT
Start: 2025-05-02 | End: 2025-05-02 | Stop reason: HOSPADM

## 2025-05-02 RX ORDER — LORAZEPAM 2 MG/ML
1 INJECTION INTRAMUSCULAR
Status: COMPLETED | OUTPATIENT
Start: 2025-05-02 | End: 2025-05-02

## 2025-05-02 RX ORDER — SODIUM CHLORIDE, SODIUM LACTATE, POTASSIUM CHLORIDE, AND CALCIUM CHLORIDE .6; .31; .03; .02 G/100ML; G/100ML; G/100ML; G/100ML
1000 INJECTION, SOLUTION INTRAVENOUS ONCE
Status: COMPLETED | OUTPATIENT
Start: 2025-05-02 | End: 2025-05-02

## 2025-05-02 RX ORDER — FENTANYL CITRATE 50 UG/ML
INJECTION, SOLUTION INTRAMUSCULAR; INTRAVENOUS
Status: DISCONTINUED | OUTPATIENT
Start: 2025-05-02 | End: 2025-05-02 | Stop reason: SDUPTHER

## 2025-05-02 RX ORDER — FENTANYL CITRATE 50 UG/ML
25 INJECTION, SOLUTION INTRAMUSCULAR; INTRAVENOUS EVERY 5 MIN PRN
Status: DISCONTINUED | OUTPATIENT
Start: 2025-05-02 | End: 2025-05-02 | Stop reason: HOSPADM

## 2025-05-02 RX ORDER — METHOCARBAMOL 750 MG/1
750 TABLET, FILM COATED ORAL EVERY 6 HOURS PRN
Status: DISCONTINUED | OUTPATIENT
Start: 2025-05-02 | End: 2025-05-05 | Stop reason: HOSPADM

## 2025-05-02 RX ORDER — METRONIDAZOLE 500 MG/100ML
500 INJECTION, SOLUTION INTRAVENOUS ONCE
Status: COMPLETED | OUTPATIENT
Start: 2025-05-02 | End: 2025-05-02

## 2025-05-02 RX ORDER — CEFAZOLIN SODIUM 1 G/3ML
INJECTION, POWDER, FOR SOLUTION INTRAMUSCULAR; INTRAVENOUS
Status: DISCONTINUED | OUTPATIENT
Start: 2025-05-02 | End: 2025-05-02 | Stop reason: SDUPTHER

## 2025-05-02 RX ORDER — CLONIDINE HYDROCHLORIDE 0.1 MG/1
0.1 TABLET ORAL EVERY 6 HOURS PRN
Status: DISCONTINUED | OUTPATIENT
Start: 2025-05-02 | End: 2025-05-05 | Stop reason: HOSPADM

## 2025-05-02 RX ORDER — FENTANYL CITRATE 50 UG/ML
50 INJECTION, SOLUTION INTRAMUSCULAR; INTRAVENOUS EVERY 5 MIN PRN
Status: DISCONTINUED | OUTPATIENT
Start: 2025-05-02 | End: 2025-05-02 | Stop reason: HOSPADM

## 2025-05-02 RX ORDER — ONDANSETRON 2 MG/ML
4 INJECTION INTRAMUSCULAR; INTRAVENOUS EVERY 6 HOURS PRN
Status: DISCONTINUED | OUTPATIENT
Start: 2025-05-02 | End: 2025-05-05 | Stop reason: HOSPADM

## 2025-05-02 RX ORDER — ONDANSETRON 2 MG/ML
4 INJECTION INTRAMUSCULAR; INTRAVENOUS
Status: DISCONTINUED | OUTPATIENT
Start: 2025-05-02 | End: 2025-05-02 | Stop reason: HOSPADM

## 2025-05-02 RX ORDER — SODIUM CHLORIDE 0.9 % (FLUSH) 0.9 %
5-40 SYRINGE (ML) INJECTION EVERY 12 HOURS SCHEDULED
Status: DISCONTINUED | OUTPATIENT
Start: 2025-05-02 | End: 2025-05-02 | Stop reason: HOSPADM

## 2025-05-02 RX ORDER — SODIUM CHLORIDE 9 MG/ML
INJECTION, SOLUTION INTRAVENOUS PRN
Status: DISCONTINUED | OUTPATIENT
Start: 2025-05-02 | End: 2025-05-05 | Stop reason: HOSPADM

## 2025-05-02 RX ORDER — SODIUM CHLORIDE 9 MG/ML
INJECTION, SOLUTION INTRAVENOUS CONTINUOUS
Status: DISCONTINUED | OUTPATIENT
Start: 2025-05-02 | End: 2025-05-05 | Stop reason: HOSPADM

## 2025-05-02 RX ORDER — PROMETHAZINE HYDROCHLORIDE 25 MG/1
12.5 TABLET ORAL EVERY 6 HOURS PRN
Status: DISCONTINUED | OUTPATIENT
Start: 2025-05-02 | End: 2025-05-05 | Stop reason: HOSPADM

## 2025-05-02 RX ORDER — SODIUM CHLORIDE 9 MG/ML
INJECTION, SOLUTION INTRAVENOUS PRN
Status: DISCONTINUED | OUTPATIENT
Start: 2025-05-02 | End: 2025-05-02 | Stop reason: HOSPADM

## 2025-05-02 RX ORDER — MORPHINE SULFATE 4 MG/ML
4 INJECTION, SOLUTION INTRAMUSCULAR; INTRAVENOUS ONCE
Status: COMPLETED | OUTPATIENT
Start: 2025-05-02 | End: 2025-05-02

## 2025-05-02 RX ORDER — KETOROLAC TROMETHAMINE 30 MG/ML
INJECTION, SOLUTION INTRAMUSCULAR; INTRAVENOUS
Status: DISCONTINUED | OUTPATIENT
Start: 2025-05-02 | End: 2025-05-02 | Stop reason: SDUPTHER

## 2025-05-02 RX ORDER — SODIUM CHLORIDE, SODIUM LACTATE, POTASSIUM CHLORIDE, CALCIUM CHLORIDE 600; 310; 30; 20 MG/100ML; MG/100ML; MG/100ML; MG/100ML
INJECTION, SOLUTION INTRAVENOUS CONTINUOUS
Status: DISCONTINUED | OUTPATIENT
Start: 2025-05-02 | End: 2025-05-02

## 2025-05-02 RX ORDER — MEPERIDINE HYDROCHLORIDE 25 MG/ML
12.5 INJECTION INTRAMUSCULAR; INTRAVENOUS; SUBCUTANEOUS
Status: DISCONTINUED | OUTPATIENT
Start: 2025-05-02 | End: 2025-05-02 | Stop reason: HOSPADM

## 2025-05-02 RX ORDER — ONDANSETRON 2 MG/ML
INJECTION INTRAMUSCULAR; INTRAVENOUS
Status: DISCONTINUED | OUTPATIENT
Start: 2025-05-02 | End: 2025-05-02 | Stop reason: SDUPTHER

## 2025-05-02 RX ORDER — IOPAMIDOL 755 MG/ML
75 INJECTION, SOLUTION INTRAVASCULAR
Status: DISCONTINUED | OUTPATIENT
Start: 2025-05-02 | End: 2025-05-05 | Stop reason: HOSPADM

## 2025-05-02 RX ORDER — LOPERAMIDE HYDROCHLORIDE 2 MG/1
2 CAPSULE ORAL 4 TIMES DAILY PRN
Status: DISCONTINUED | OUTPATIENT
Start: 2025-05-02 | End: 2025-05-05 | Stop reason: HOSPADM

## 2025-05-02 RX ORDER — SODIUM CHLORIDE 0.9 % (FLUSH) 0.9 %
5-40 SYRINGE (ML) INJECTION PRN
Status: DISCONTINUED | OUTPATIENT
Start: 2025-05-02 | End: 2025-05-02 | Stop reason: HOSPADM

## 2025-05-02 RX ORDER — PROMETHAZINE HYDROCHLORIDE 25 MG/1
25 TABLET ORAL EVERY 6 HOURS PRN
Status: DISCONTINUED | OUTPATIENT
Start: 2025-05-02 | End: 2025-05-02

## 2025-05-02 RX ORDER — HYDROXYZINE HYDROCHLORIDE 10 MG/1
10 TABLET, FILM COATED ORAL EVERY 8 HOURS PRN
Status: DISCONTINUED | OUTPATIENT
Start: 2025-05-02 | End: 2025-05-05 | Stop reason: HOSPADM

## 2025-05-02 RX ORDER — OXYCODONE HYDROCHLORIDE 10 MG/1
10 TABLET ORAL EVERY 4 HOURS PRN
Status: DISCONTINUED | OUTPATIENT
Start: 2025-05-02 | End: 2025-05-05 | Stop reason: HOSPADM

## 2025-05-02 RX ORDER — SUCCINYLCHOLINE/SOD CL,ISO/PF 200MG/10ML
SYRINGE (ML) INTRAVENOUS
Status: DISCONTINUED | OUTPATIENT
Start: 2025-05-02 | End: 2025-05-02 | Stop reason: SDUPTHER

## 2025-05-02 RX ORDER — OXYCODONE HYDROCHLORIDE 5 MG/1
5 TABLET ORAL EVERY 4 HOURS PRN
Status: DISCONTINUED | OUTPATIENT
Start: 2025-05-02 | End: 2025-05-05 | Stop reason: HOSPADM

## 2025-05-02 RX ORDER — SODIUM CHLORIDE 0.9 % (FLUSH) 0.9 %
5-40 SYRINGE (ML) INJECTION EVERY 12 HOURS SCHEDULED
Status: DISCONTINUED | OUTPATIENT
Start: 2025-05-02 | End: 2025-05-05 | Stop reason: HOSPADM

## 2025-05-02 RX ORDER — SODIUM CHLORIDE 0.9 % (FLUSH) 0.9 %
5-40 SYRINGE (ML) INJECTION PRN
Status: DISCONTINUED | OUTPATIENT
Start: 2025-05-02 | End: 2025-05-05 | Stop reason: HOSPADM

## 2025-05-02 RX ORDER — HYDROXYZINE PAMOATE 25 MG/1
50 CAPSULE ORAL EVERY 8 HOURS PRN
Status: DISCONTINUED | OUTPATIENT
Start: 2025-05-02 | End: 2025-05-05 | Stop reason: HOSPADM

## 2025-05-02 RX ORDER — SENNA AND DOCUSATE SODIUM 50; 8.6 MG/1; MG/1
1 TABLET, FILM COATED ORAL 2 TIMES DAILY
Status: DISCONTINUED | OUTPATIENT
Start: 2025-05-02 | End: 2025-05-05 | Stop reason: HOSPADM

## 2025-05-02 RX ORDER — SODIUM CHLORIDE 9 MG/ML
INJECTION, SOLUTION INTRAVENOUS
Status: DISCONTINUED | OUTPATIENT
Start: 2025-05-02 | End: 2025-05-02 | Stop reason: SDUPTHER

## 2025-05-02 RX ORDER — NALOXONE HYDROCHLORIDE 0.4 MG/ML
INJECTION, SOLUTION INTRAMUSCULAR; INTRAVENOUS; SUBCUTANEOUS PRN
Status: DISCONTINUED | OUTPATIENT
Start: 2025-05-02 | End: 2025-05-02 | Stop reason: HOSPADM

## 2025-05-02 RX ORDER — GABAPENTIN 300 MG/1
300 CAPSULE ORAL EVERY 8 HOURS PRN
Status: DISCONTINUED | OUTPATIENT
Start: 2025-05-02 | End: 2025-05-05 | Stop reason: HOSPADM

## 2025-05-02 RX ADMIN — PROPOFOL 200 MG: 10 INJECTION, EMULSION INTRAVENOUS at 20:58

## 2025-05-02 RX ADMIN — SODIUM CHLORIDE: 9 INJECTION, SOLUTION INTRAVENOUS at 22:38

## 2025-05-02 RX ADMIN — Medication 3 MG: at 22:33

## 2025-05-02 RX ADMIN — Medication 1 MG: at 21:15

## 2025-05-02 RX ADMIN — HYDROMORPHONE HYDROCHLORIDE 0.5 MG: 1 INJECTION, SOLUTION INTRAMUSCULAR; INTRAVENOUS; SUBCUTANEOUS at 23:22

## 2025-05-02 RX ADMIN — FENTANYL CITRATE 100 MCG: 50 INJECTION, SOLUTION INTRAMUSCULAR; INTRAVENOUS at 20:58

## 2025-05-02 RX ADMIN — LORAZEPAM 1 MG: 2 INJECTION INTRAMUSCULAR; INTRAVENOUS at 21:48

## 2025-05-02 RX ADMIN — SODIUM CHLORIDE: 9 INJECTION, SOLUTION INTRAVENOUS at 20:50

## 2025-05-02 RX ADMIN — FENTANYL CITRATE 50 MCG: 50 INJECTION INTRAMUSCULAR; INTRAVENOUS at 21:54

## 2025-05-02 RX ADMIN — SENNOSIDES, DOCUSATE SODIUM 1 TABLET: 50; 8.6 TABLET, FILM COATED ORAL at 22:33

## 2025-05-02 RX ADMIN — CEFEPIME 2000 MG: 2 INJECTION, POWDER, FOR SOLUTION INTRAVENOUS at 19:05

## 2025-05-02 RX ADMIN — Medication 180 MG: at 20:58

## 2025-05-02 RX ADMIN — SODIUM CHLORIDE, POTASSIUM CHLORIDE, SODIUM LACTATE AND CALCIUM CHLORIDE 1000 ML: 600; 310; 30; 20 INJECTION, SOLUTION INTRAVENOUS at 20:08

## 2025-05-02 RX ADMIN — METRONIDAZOLE 500 MG: 500 INJECTION, SOLUTION INTRAVENOUS at 20:14

## 2025-05-02 RX ADMIN — CEFAZOLIN SODIUM 2 G: 1 INJECTION, POWDER, FOR SOLUTION INTRAMUSCULAR; INTRAVENOUS at 21:00

## 2025-05-02 RX ADMIN — MORPHINE SULFATE 4 MG: 4 INJECTION, SOLUTION INTRAMUSCULAR; INTRAVENOUS at 19:00

## 2025-05-02 RX ADMIN — OXYCODONE HYDROCHLORIDE 10 MG: 10 TABLET ORAL at 22:33

## 2025-05-02 RX ADMIN — KETOROLAC TROMETHAMINE 30 MG: 30 INJECTION, SOLUTION INTRAMUSCULAR; INTRAVENOUS at 21:09

## 2025-05-02 RX ADMIN — ONDANSETRON 4 MG: 2 INJECTION INTRAMUSCULAR; INTRAVENOUS at 21:07

## 2025-05-02 RX ADMIN — VANCOMYCIN HYDROCHLORIDE 1000 MG: 1 INJECTION, POWDER, LYOPHILIZED, FOR SOLUTION INTRAVENOUS at 22:43

## 2025-05-02 RX ADMIN — SODIUM CHLORIDE, PRESERVATIVE FREE 10 ML: 5 INJECTION INTRAVENOUS at 22:34

## 2025-05-02 RX ADMIN — FENTANYL CITRATE 50 MCG: 50 INJECTION INTRAMUSCULAR; INTRAVENOUS at 21:47

## 2025-05-02 RX ADMIN — Medication 15 MG: at 21:11

## 2025-05-02 RX ADMIN — Medication 10 MG: at 21:02

## 2025-05-02 RX ADMIN — HYDROXYZINE PAMOATE 50 MG: 25 CAPSULE ORAL at 19:00

## 2025-05-02 ASSESSMENT — PAIN SCALES - GENERAL
PAINLEVEL_OUTOF10: 10
PAINLEVEL_OUTOF10: 9
PAINLEVEL_OUTOF10: 10
PAINLEVEL_OUTOF10: 8

## 2025-05-02 ASSESSMENT — PAIN DESCRIPTION - LOCATION
LOCATION: ARM

## 2025-05-02 ASSESSMENT — PAIN DESCRIPTION - DESCRIPTORS
DESCRIPTORS: BURNING
DESCRIPTORS: BURNING
DESCRIPTORS: ACHING;DISCOMFORT;BURNING
DESCRIPTORS: ACHING;BURNING
DESCRIPTORS: ACHING;BURNING;DISCOMFORT
DESCRIPTORS: BURNING
DESCRIPTORS: ACHING;BURNING;DISCOMFORT

## 2025-05-02 ASSESSMENT — PAIN DESCRIPTION - FREQUENCY
FREQUENCY: CONTINUOUS

## 2025-05-02 ASSESSMENT — LIFESTYLE VARIABLES
SMOKING_STATUS: 1
HOW OFTEN DO YOU HAVE A DRINK CONTAINING ALCOHOL: NEVER
HOW MANY STANDARD DRINKS CONTAINING ALCOHOL DO YOU HAVE ON A TYPICAL DAY: PATIENT DOES NOT DRINK

## 2025-05-02 ASSESSMENT — PAIN DESCRIPTION - PAIN TYPE
TYPE: ACUTE PAIN
TYPE: SURGICAL PAIN
TYPE: ACUTE PAIN

## 2025-05-02 ASSESSMENT — PAIN DESCRIPTION - ONSET: ONSET: ON-GOING

## 2025-05-02 ASSESSMENT — PAIN - FUNCTIONAL ASSESSMENT
PAIN_FUNCTIONAL_ASSESSMENT: PREVENTS OR INTERFERES SOME ACTIVE ACTIVITIES AND ADLS
PAIN_FUNCTIONAL_ASSESSMENT: 0-10
PAIN_FUNCTIONAL_ASSESSMENT: PREVENTS OR INTERFERES SOME ACTIVE ACTIVITIES AND ADLS
PAIN_FUNCTIONAL_ASSESSMENT: ACTIVITIES ARE NOT PREVENTED
PAIN_FUNCTIONAL_ASSESSMENT: PREVENTS OR INTERFERES SOME ACTIVE ACTIVITIES AND ADLS
PAIN_FUNCTIONAL_ASSESSMENT: 0-10

## 2025-05-02 ASSESSMENT — PAIN DESCRIPTION - ORIENTATION
ORIENTATION: LEFT

## 2025-05-02 NOTE — ED TRIAGE NOTES
Pt states that he has been experiencing a cyst on his left arm for the past 4 days. Pt states that his redness, pain, and swelling has gotten significantly worse in the past day. Pt endorses fever/chills. Pt states that he is an IV drug user. Redness and swelling spans from pt left wrist to left armpit. Pt AAO x 4. VSS.

## 2025-05-02 NOTE — ED NOTES
Patient to ER from [facility]. Patient uses IV Fentanyl, last use about 10am today.

## 2025-05-02 NOTE — ED PROVIDER NOTES
[facility] EMERGENCY DEPARTMENT  [address] [address]  Dept: [phone]  Loc: [phone]  eMERGENCYdEPARTMENT eNCOUnter      Pt Name: Onesimo Cullen  MRN: [id number]  Birthdate 1990  Date of evaluation: 5/2/2025  Provider:CARY Herzog CNP    Independently seen and evaluated by the advanced practice provider  CHIEF COMPLAINT       Chief Complaint   Patient presents with    Cyst     Pt states that he has been experiencing a cyst on his left arm for the past 4 days. Pt states that his redness, pain, and swelling has gotten significantly worse in the past day. Pt endorses fever/chills. Pt states that he is an IV drug user. Redness and swelling spans from pt left wrist to left armpit. Pt AAO x 4. VSS.        CRITICAL CARE TIME       HISTORY OF PRESENT ILLNESS  (Location/Symptom, Timing/Onset, Context/Setting, Quality, Duration,Modifying Factors, Severity.)   Onesimo Cullen is a 34 y.o. male who presents to the emergency department PMHx: IVDU active, sepsis, hep C, and MRSA bacteremia    Lives at home  CODE STATUS full  Anticoagulation therapy: None  Social determinants: No PCP, addiction, no assistive device  HPI provided by the patient    Patient presents to the emergency department tearful, in obvious pain and discomfort due to swelling and redness and pain of the left upper extremity.  States that an abscess started 4 days ago.  Is an active IV drug user.  Injects heroin.  Last injected heroin 8 hours ago.  Typically goes into withdrawal within 12 hours.    Negative for any other source of trauma.  Patient cannot straighten out his left arm.  He has pain that is radiating through the entire left arm and into the left chest wall.  Not aware of any fever or chills    Patient is actively crying.  Nursing Notes were reviewedand agreed with or any disagreements were addressed in the HPI.    REVIEW OF SYSTEMS    (2-9 systems for level 4, 10 or

## 2025-05-03 PROBLEM — F11.29 OPIOID DEPENDENCE WITH OPIOID-INDUCED DISORDER (HCC): Status: ACTIVE | Noted: 2025-05-03

## 2025-05-03 PROBLEM — Z11.4 SCREENING FOR HIV (HUMAN IMMUNODEFICIENCY VIRUS): Status: ACTIVE | Noted: 2025-05-03

## 2025-05-03 PROBLEM — T79.A12A: Status: ACTIVE | Noted: 2025-05-03

## 2025-05-03 PROBLEM — Z86.14 HISTORY OF METHICILLIN RESISTANT STAPHYLOCOCCUS AUREUS (MRSA): Status: ACTIVE | Noted: 2025-05-03

## 2025-05-03 PROBLEM — B99.9 INFECTION REQUIRING CONTACT ISOLATION PRECAUTIONS: Status: ACTIVE | Noted: 2025-05-03

## 2025-05-03 PROBLEM — Z86.19 HISTORY OF HEPATITIS C: Status: ACTIVE | Noted: 2025-05-03

## 2025-05-03 LAB
ANION GAP SERPL CALCULATED.3IONS-SCNC: 11 MMOL/L (ref 3–16)
BASOPHILS # BLD: 0 K/UL (ref 0–0.2)
BASOPHILS NFR BLD: 0.2 %
BUN SERPL-MCNC: 6 MG/DL (ref 7–20)
CALCIUM SERPL-MCNC: 8.2 MG/DL (ref 8.3–10.6)
CHLORIDE SERPL-SCNC: 102 MMOL/L (ref 99–110)
CO2 SERPL-SCNC: 23 MMOL/L (ref 21–32)
CREAT SERPL-MCNC: 0.6 MG/DL (ref 0.9–1.3)
CRP SERPL-MCNC: 206 MG/L (ref 0–5.1)
DEPRECATED RDW RBC AUTO: 13.5 % (ref 12.4–15.4)
EKG ATRIAL RATE: 97 BPM
EKG DIAGNOSIS: NORMAL
EKG P AXIS: 82 DEGREES
EKG P-R INTERVAL: 130 MS
EKG Q-T INTERVAL: 368 MS
EKG QRS DURATION: 96 MS
EKG QTC CALCULATION (BAZETT): 467 MS
EKG R AXIS: 88 DEGREES
EKG T AXIS: 73 DEGREES
EKG VENTRICULAR RATE: 97 BPM
EOSINOPHIL # BLD: 0 K/UL (ref 0–0.6)
EOSINOPHIL NFR BLD: 0.1 %
ERYTHROCYTE [SEDIMENTATION RATE] IN BLOOD BY WESTERGREN METHOD: 72 MM/HR (ref 0–15)
GFR SERPLBLD CREATININE-BSD FMLA CKD-EPI: >90 ML/MIN/{1.73_M2}
GLUCOSE SERPL-MCNC: 122 MG/DL (ref 70–99)
HBV SURFACE AB SERPL IA-ACNC: 98.1 MIU/ML
HCT VFR BLD AUTO: 27.7 % (ref 40.5–52.5)
HGB BLD-MCNC: 9.5 G/DL (ref 13.5–17.5)
LACTATE BLDV-SCNC: 0.6 MMOL/L (ref 0.4–1.9)
LYMPHOCYTES # BLD: 1.6 K/UL (ref 1–5.1)
LYMPHOCYTES NFR BLD: 10.4 %
MCH RBC QN AUTO: 28.4 PG (ref 26–34)
MCHC RBC AUTO-ENTMCNC: 34.1 G/DL (ref 31–36)
MCV RBC AUTO: 83.3 FL (ref 80–100)
MONOCYTES # BLD: 1.2 K/UL (ref 0–1.3)
MONOCYTES NFR BLD: 7.9 %
MRSA DNA SPEC QL NAA+PROBE: NORMAL
NEUTROPHILS # BLD: 12.3 K/UL (ref 1.7–7.7)
NEUTROPHILS NFR BLD: 81.4 %
PLATELET # BLD AUTO: 321 K/UL (ref 135–450)
PMV BLD AUTO: 6.8 FL (ref 5–10.5)
POTASSIUM SERPL-SCNC: 3.6 MMOL/L (ref 3.5–5.1)
RBC # BLD AUTO: 3.33 M/UL (ref 4.2–5.9)
REASON FOR REJECTION: NORMAL
REJECTED TEST: NORMAL
SODIUM SERPL-SCNC: 136 MMOL/L (ref 136–145)
WBC # BLD AUTO: 15.1 K/UL (ref 4–11)

## 2025-05-03 PROCEDURE — 80048 BASIC METABOLIC PNL TOTAL CA: CPT

## 2025-05-03 PROCEDURE — 6360000002 HC RX W HCPCS: Performed by: STUDENT IN AN ORGANIZED HEALTH CARE EDUCATION/TRAINING PROGRAM

## 2025-05-03 PROCEDURE — 6370000000 HC RX 637 (ALT 250 FOR IP): Performed by: INTERNAL MEDICINE

## 2025-05-03 PROCEDURE — 87641 MR-STAPH DNA AMP PROBE: CPT

## 2025-05-03 PROCEDURE — 6360000002 HC RX W HCPCS: Performed by: ORTHOPAEDIC SURGERY

## 2025-05-03 PROCEDURE — 2580000003 HC RX 258: Performed by: STUDENT IN AN ORGANIZED HEALTH CARE EDUCATION/TRAINING PROGRAM

## 2025-05-03 PROCEDURE — 86701 HIV-1ANTIBODY: CPT

## 2025-05-03 PROCEDURE — 83605 ASSAY OF LACTIC ACID: CPT

## 2025-05-03 PROCEDURE — 2580000003 HC RX 258: Performed by: ORTHOPAEDIC SURGERY

## 2025-05-03 PROCEDURE — 86706 HEP B SURFACE ANTIBODY: CPT

## 2025-05-03 PROCEDURE — 9990000010 HC NO CHARGE VISIT

## 2025-05-03 PROCEDURE — 86140 C-REACTIVE PROTEIN: CPT

## 2025-05-03 PROCEDURE — 87522 HEPATITIS C REVRS TRNSCRPJ: CPT

## 2025-05-03 PROCEDURE — 36415 COLL VENOUS BLD VENIPUNCTURE: CPT

## 2025-05-03 PROCEDURE — 6370000000 HC RX 637 (ALT 250 FOR IP): Performed by: ORTHOPAEDIC SURGERY

## 2025-05-03 PROCEDURE — 85025 COMPLETE CBC W/AUTO DIFF WBC: CPT

## 2025-05-03 PROCEDURE — 94760 N-INVAS EAR/PLS OXIMETRY 1: CPT

## 2025-05-03 PROCEDURE — 6370000000 HC RX 637 (ALT 250 FOR IP): Performed by: STUDENT IN AN ORGANIZED HEALTH CARE EDUCATION/TRAINING PROGRAM

## 2025-05-03 PROCEDURE — 85652 RBC SED RATE AUTOMATED: CPT

## 2025-05-03 PROCEDURE — 87390 HIV-1 AG IA: CPT

## 2025-05-03 PROCEDURE — 86702 HIV-2 ANTIBODY: CPT

## 2025-05-03 PROCEDURE — 93010 ELECTROCARDIOGRAM REPORT: CPT | Performed by: INTERNAL MEDICINE

## 2025-05-03 PROCEDURE — 99255 IP/OBS CONSLTJ NEW/EST HI 80: CPT | Performed by: INTERNAL MEDICINE

## 2025-05-03 PROCEDURE — 1200000000 HC SEMI PRIVATE

## 2025-05-03 RX ORDER — POTASSIUM CHLORIDE 7.45 MG/ML
10 INJECTION INTRAVENOUS PRN
Status: DISCONTINUED | OUTPATIENT
Start: 2025-05-03 | End: 2025-05-05 | Stop reason: HOSPADM

## 2025-05-03 RX ORDER — MORPHINE SULFATE 4 MG/ML
4 INJECTION, SOLUTION INTRAMUSCULAR; INTRAVENOUS
Status: DISCONTINUED | OUTPATIENT
Start: 2025-05-03 | End: 2025-05-05 | Stop reason: HOSPADM

## 2025-05-03 RX ORDER — POTASSIUM CHLORIDE 1500 MG/1
40 TABLET, EXTENDED RELEASE ORAL PRN
Status: DISCONTINUED | OUTPATIENT
Start: 2025-05-03 | End: 2025-05-05 | Stop reason: HOSPADM

## 2025-05-03 RX ORDER — BUPRENORPHINE HYDROCHLORIDE AND NALOXONE HYDROCHLORIDE DIHYDRATE 8; 2 MG/1; MG/1
2 TABLET SUBLINGUAL DAILY
COMMUNITY
Start: 2025-04-04

## 2025-05-03 RX ORDER — MORPHINE SULFATE 2 MG/ML
2 INJECTION, SOLUTION INTRAMUSCULAR; INTRAVENOUS
Status: DISCONTINUED | OUTPATIENT
Start: 2025-05-03 | End: 2025-05-05 | Stop reason: HOSPADM

## 2025-05-03 RX ORDER — ACETAMINOPHEN 325 MG/1
650 TABLET ORAL EVERY 4 HOURS PRN
Status: DISCONTINUED | OUTPATIENT
Start: 2025-05-03 | End: 2025-05-05 | Stop reason: HOSPADM

## 2025-05-03 RX ORDER — POTASSIUM CHLORIDE 1500 MG/1
40 TABLET, EXTENDED RELEASE ORAL ONCE
Status: COMPLETED | OUTPATIENT
Start: 2025-05-03 | End: 2025-05-03

## 2025-05-03 RX ORDER — MAGNESIUM SULFATE IN WATER 40 MG/ML
2000 INJECTION, SOLUTION INTRAVENOUS PRN
Status: DISCONTINUED | OUTPATIENT
Start: 2025-05-03 | End: 2025-05-05 | Stop reason: HOSPADM

## 2025-05-03 RX ADMIN — HYDROMORPHONE HYDROCHLORIDE 0.5 MG: 1 INJECTION, SOLUTION INTRAMUSCULAR; INTRAVENOUS; SUBCUTANEOUS at 02:40

## 2025-05-03 RX ADMIN — MORPHINE SULFATE 4 MG: 4 INJECTION, SOLUTION INTRAMUSCULAR; INTRAVENOUS at 18:16

## 2025-05-03 RX ADMIN — VANCOMYCIN HYDROCHLORIDE 1000 MG: 1 INJECTION, POWDER, LYOPHILIZED, FOR SOLUTION INTRAVENOUS at 10:12

## 2025-05-03 RX ADMIN — OXYCODONE HYDROCHLORIDE 10 MG: 10 TABLET ORAL at 02:20

## 2025-05-03 RX ADMIN — MORPHINE SULFATE 4 MG: 4 INJECTION, SOLUTION INTRAMUSCULAR; INTRAVENOUS at 21:15

## 2025-05-03 RX ADMIN — OXYCODONE HYDROCHLORIDE 10 MG: 10 TABLET ORAL at 06:35

## 2025-05-03 RX ADMIN — MORPHINE SULFATE 4 MG: 4 INJECTION, SOLUTION INTRAMUSCULAR; INTRAVENOUS at 15:04

## 2025-05-03 RX ADMIN — SODIUM CHLORIDE: 9 INJECTION, SOLUTION INTRAVENOUS at 13:42

## 2025-05-03 RX ADMIN — HYDROMORPHONE HYDROCHLORIDE 0.5 MG: 1 INJECTION, SOLUTION INTRAMUSCULAR; INTRAVENOUS; SUBCUTANEOUS at 07:59

## 2025-05-03 RX ADMIN — MORPHINE SULFATE 4 MG: 4 INJECTION, SOLUTION INTRAMUSCULAR; INTRAVENOUS at 11:42

## 2025-05-03 RX ADMIN — OXYCODONE HYDROCHLORIDE 10 MG: 10 TABLET ORAL at 20:15

## 2025-05-03 RX ADMIN — VANCOMYCIN HYDROCHLORIDE 1000 MG: 1 INJECTION, POWDER, LYOPHILIZED, FOR SOLUTION INTRAVENOUS at 18:22

## 2025-05-03 RX ADMIN — HYDROXYZINE PAMOATE 50 MG: 25 CAPSULE ORAL at 20:18

## 2025-05-03 RX ADMIN — ACETAMINOPHEN 650 MG: 325 TABLET ORAL at 07:58

## 2025-05-03 RX ADMIN — OXYCODONE HYDROCHLORIDE 10 MG: 10 TABLET ORAL at 13:48

## 2025-05-03 RX ADMIN — Medication 3 MG: at 20:15

## 2025-05-03 RX ADMIN — POTASSIUM CHLORIDE 40 MEQ: 1500 TABLET, EXTENDED RELEASE ORAL at 13:40

## 2025-05-03 ASSESSMENT — PAIN SCALES - GENERAL
PAINLEVEL_OUTOF10: 9
PAINLEVEL_OUTOF10: 9
PAINLEVEL_OUTOF10: 10
PAINLEVEL_OUTOF10: 9
PAINLEVEL_OUTOF10: 10
PAINLEVEL_OUTOF10: 0
PAINLEVEL_OUTOF10: 10
PAINLEVEL_OUTOF10: 9
PAINLEVEL_OUTOF10: 8
PAINLEVEL_OUTOF10: 4
PAINLEVEL_OUTOF10: 0

## 2025-05-03 ASSESSMENT — PAIN DESCRIPTION - ORIENTATION
ORIENTATION: LEFT

## 2025-05-03 ASSESSMENT — PAIN DESCRIPTION - LOCATION
LOCATION: GENERALIZED;ARM
LOCATION: ARM
LOCATION: ARM;GENERALIZED
LOCATION: ARM

## 2025-05-03 ASSESSMENT — PAIN DESCRIPTION - DESCRIPTORS
DESCRIPTORS: BURNING;DISCOMFORT
DESCRIPTORS: ACHING;BURNING;DISCOMFORT
DESCRIPTORS: DISCOMFORT;THROBBING;BURNING
DESCRIPTORS: DISCOMFORT;THROBBING;BURNING
DESCRIPTORS: BURNING;DISCOMFORT;THROBBING
DESCRIPTORS: BURNING;DISCOMFORT
DESCRIPTORS: ACHING;BURNING;DISCOMFORT
DESCRIPTORS: DISCOMFORT;BURNING
DESCRIPTORS: ACHING;BURNING;DISCOMFORT
DESCRIPTORS: ACHING;BURNING;DISCOMFORT

## 2025-05-03 ASSESSMENT — PAIN DESCRIPTION - PAIN TYPE
TYPE: ACUTE PAIN;SURGICAL PAIN
TYPE: ACUTE PAIN
TYPE: ACUTE PAIN;SURGICAL PAIN

## 2025-05-03 ASSESSMENT — PAIN - FUNCTIONAL ASSESSMENT
PAIN_FUNCTIONAL_ASSESSMENT: PREVENTS OR INTERFERES SOME ACTIVE ACTIVITIES AND ADLS

## 2025-05-03 ASSESSMENT — PAIN DESCRIPTION - FREQUENCY
FREQUENCY: CONTINUOUS

## 2025-05-03 ASSESSMENT — PAIN SCALES - WONG BAKER: WONGBAKER_NUMERICALRESPONSE: NO HURT

## 2025-05-03 ASSESSMENT — PAIN DESCRIPTION - ONSET
ONSET: ON-GOING

## 2025-05-03 NOTE — ANESTHESIA PRE PROCEDURE
[facility] Department of Anesthesiology  Pre-Anesthesia Evaluation/Consultation       Name:  Onesimo Cullen  : 1990  Age:  34 y.o.                                           MRN:  [id number]  Date: 2025           Surgeon: Surgeon(s):  Nikita Fisher MD    Procedure: Procedure(s):  HAND INCISION AND DRAINAGE     No Known Allergies  Patient Active Problem List   Diagnosis   • IVDU (intravenous drug user)   • Sepsis (HCC)     Past Medical History:   Diagnosis Date   • Hepatitis C    • Illicit drug use    • MRSA bacteremia 10/28/2020   • MRSA infection 10/28/2020    abscess face     Past Surgical History:   Procedure Laterality Date   • FRACTURE SURGERY      Right wrist, L collar bone     Social History     Tobacco Use   • Smoking status: Every Day     Current packs/day: 1.00     Types: Cigarettes   • Smokeless tobacco: Current     Types: Chew   Vaping Use   • Vaping status: Never Used   Substance Use Topics   • Alcohol use: Yes     Alcohol/week: 12.0 standard drinks of alcohol     Types: 12 Cans of beer per week     Comment: occasional use   • Drug use: Yes     Types: IV, Opiates , Methamphetamines (Crystal Meth), Marijuana (Weed)     Comment: 0.5 gram a day      Medications  No current facility-administered medications on file prior to encounter.     Current Outpatient Medications on File Prior to Encounter   Medication Sig Dispense Refill   • ibuprofen (IBU) 800 MG tablet Take 1 tablet by mouth every 8 hours as needed for Pain 20 tablet 0   • acetaminophen (TYLENOL) 500 MG tablet Take 2 tablets by mouth 3 times daily as needed for Pain 180 tablet 0   • naloxone 4 MG/0.1ML LIQD nasal spray 1 spray by Nasal route as needed for Opioid Reversal 2 each 1     Current Facility-Administered Medications   Medication Dose Route Frequency Provider Last Rate Last Admin   • vancomycin (VANCOCIN) 1,500 mg in sodium chloride 0.9 % 250 mL (addEASE) IVPB  1,500 mg IntraVENous Once Dede Forbes, APRN - CNP

## 2025-05-03 NOTE — ANESTHESIA POSTPROCEDURE EVALUATION
Department of Anesthesiology  Postprocedure Note    Patient: Onesimo Cullen  MRN: [id number]  YOB: 1990  Date of evaluation: 5/2/2025    Procedure Summary       Date: 05/02/25 Room / Location: [facility] CT Scan    Anesthesia Start: 2050 Anesthesia Stop: 2139    Procedure: CT HUMERUS LEFT W CONTRAST Diagnosis: (Abscess, IV drug use)    Scheduled Providers:  Responsible Provider: Aayush Ambriz MD    Anesthesia Type: general ASA Status: 3 - Emergent            Anesthesia Type: No value filed.    Trini Phase I: Trini Score: 10    Trini Phase II:      Anesthesia Post Evaluation    Patient location during evaluation: PACU  Patient participation: complete - patient participated  Level of consciousness: awake and alert  Pain score: 4  Airway patency: patent  Nausea & Vomiting: no nausea and no vomiting  Cardiovascular status: blood pressure returned to baseline  Respiratory status: acceptable  Hydration status: euvolemic  Pain management: adequate    No notable events documented.

## 2025-05-03 NOTE — ED PROVIDER NOTES
I did not perform an evaluation of Onesimo Cullen but was asked to review his EKG.     EKG interpreted by myself.   Rate: normal  Rhythm: NSR  Axis: normal  Intervals: within normal limits  ST Segments: no acute abnormality  T waves: no acute abnormality  Comparison: Compared to 4/25/2024 rhythm change from sinus tachycardia with RSR prime pattern to normal sinus rhythm  Impression: NSR with no acute abnormality         Kervin Pike MD  05/03/25 0013

## 2025-05-03 NOTE — OP NOTE
Operative Note      Patient: Onesimo Cullen  YOB: 1990  MRN: [id number]    Date of Procedure: 5/2/2025    Pre-Op Diagnosis Codes:   Deep abscess left arm [T79.A12A]    Post-Op Diagnosis: Same       Procedure(s):  INCISION AND DRAINAGE LEFT ARM ABCESS    Surgeon(s):  Nikita Fisher MD    Assistant:   Surgical Assistant: Eufemia Dumont    Anesthesia: General    Estimated Blood Loss (mL): less than 50     Complications: None    Specimens:   ID Type Source Tests Collected by Time Destination   1 : 1) LEFT ARM ABCESS Specimen Arm CULTURE, SURGICAL Nikita Fisher MD 5/2/2025 2108        Implants:  * No implants in log *      Drains: * No LDAs found *    Findings:  Infection Present At Time Of Surgery (PATOS) (choose all levels that have infection present):  - Deep Infection (muscle/fascia) present as evidenced by abscess and pus  Other Findings: Deep infection noted.    Detailed Description of Procedure:   History: The patient is a 34-year-old gentleman who is an IV drug user.  He noted increasing swelling and pain in the left forearm and elbow over the past 4 to 5 days.  He presented to the emergency room and orthopedics was consulted.  On evaluation there was concern about a deep abscess with concerns of necrotizing fasciitis.  On evaluation the compartments were somewhat soft but he had an obvious abscess into the forearm and elbow extending up into the arm.  Due to this fact the patient was semiemergently taken to surgery for open irrigation and debridement.    Report: The patient was identified preoperatively.  The patient was then taken the operating room and general anesthesia was administered by anesthesia.  A nonsterile tourniquet was applied to the left arm.  A Betadine scrub and paint was then performed to the left upper extremity in standard fashion.  We then draped out in standard fashion.  We elevated the tourniquet to 250 mmHg.  We then were ready for incision.  We made a 13 to

## 2025-05-03 NOTE — PLAN OF CARE
Problem: Discharge Planning  Goal: Discharge to home or other facility with appropriate resources  Outcome: Progressing  Flowsheets  Taken 5/3/2025 0055  Discharge to home or other facility with appropriate resources:   Identify barriers to discharge with patient and caregiver   Identify discharge learning needs (meds, wound care, etc)   Refer to discharge planning if patient needs post-hospital services based on physician order or complex needs related to functional status, cognitive ability or social support system   Arrange for needed discharge resources and transportation as appropriate  Taken 5/2/2025 2216  Discharge to home or other facility with appropriate resources:   Identify barriers to discharge with patient and caregiver   Arrange for needed discharge resources and transportation as appropriate   Identify discharge learning needs (meds, wound care, etc)     Problem: Pain  Goal: Verbalizes/displays adequate comfort level or baseline comfort level  Outcome: Progressing  Flowsheets (Taken 5/2/2025 2216)  Verbalizes/displays adequate comfort level or baseline comfort level:   Encourage patient to monitor pain and request assistance   Assess pain using appropriate pain scale   Administer analgesics based on type and severity of pain and evaluate response   Implement non-pharmacological measures as appropriate and evaluate response   Consider cultural and social influences on pain and pain management     Problem: Safety - Adult  Goal: Free from fall injury  Outcome: Progressing     Problem: ABCDS Injury Assessment  Goal: Absence of physical injury  Outcome: Progressing     Problem: Skin/Tissue Integrity  Goal: Skin integrity remains intact  Description: 1.  Monitor for areas of redness and/or skin breakdown2.  Assess vascular access sites hourly3.  Every 4-6 hours minimum:  Change oxygen saturation probe site4.  Every 4-6 hours:  If on nasal continuous positive airway pressure, respiratory therapy assess

## 2025-05-03 NOTE — PLAN OF CARE
Problem: Pain  Goal: Verbalizes/displays adequate comfort level or baseline comfort level     Problem: Safety - Adult  Goal: Free from fall injury     Problem: ABCDS Injury Assessment  Goal: Absence of physical injury     Problem: Skin/Tissue Integrity  Goal: Skin integrity remains intact

## 2025-05-03 NOTE — ANESTHESIA PRE PROCEDURE
[facility] Department of Anesthesiology  Pre-Anesthesia Evaluation/Consultation       Name:  Onesimo Cullen  : 1990  Age:  34 y.o.                                           MRN:  [id number]  Date: 2025           Surgeon: * No surgeons listed *    Procedure: * No procedures listed *     No Known Allergies  Patient Active Problem List   Diagnosis   • IVDU (intravenous drug user)   • Sepsis (HCC)   • Status post incision and drainage   • Cellulitis of left upper extremity     Past Medical History:   Diagnosis Date   • Hepatitis C    • Illicit drug use    • MRSA bacteremia 10/28/2020   • MRSA infection 10/28/2020    abscess face     Past Surgical History:   Procedure Laterality Date   • FRACTURE SURGERY      Right wrist, L collar bone     Social History     Tobacco Use   • Smoking status: Every Day     Current packs/day: 1.00     Types: Cigarettes   • Smokeless tobacco: Current     Types: Chew   Vaping Use   • Vaping status: Never Used   Substance Use Topics   • Alcohol use: Yes     Alcohol/week: 12.0 standard drinks of alcohol     Types: 12 Cans of beer per week     Comment: occasional use   • Drug use: Yes     Types: IV, Opiates , Methamphetamines (Crystal Meth), Marijuana (Weed)     Comment: 0.5 gram a day      Medications  No current facility-administered medications on file prior to encounter.     Current Outpatient Medications on File Prior to Encounter   Medication Sig Dispense Refill   • ibuprofen (IBU) 800 MG tablet Take 1 tablet by mouth every 8 hours as needed for Pain 20 tablet 0   • acetaminophen (TYLENOL) 500 MG tablet Take 2 tablets by mouth 3 times daily as needed for Pain 180 tablet 0   • naloxone 4 MG/0.1ML LIQD nasal spray 1 spray by Nasal route as needed for Opioid Reversal 2 each 1     Current Facility-Administered Medications   Medication Dose Route Frequency Provider Last Rate Last Admin   • vancomycin (VANCOCIN) 1,500 mg in sodium chloride 0.9 % 250 mL (addEASE) IVPB  1,500 mg

## 2025-05-03 NOTE — ED NOTES
Report given to PACU nurses and OR    @nd cultures and repeat lactate drawn prior to Flagyl starting: pt had diffculty staying still which makes me concerned they cultures might be \"dirty.\" Three chloropreps were used prior to draw

## 2025-05-03 NOTE — CONSULTS
Clinical Pharmacy Note  Vancomycin Consult    Onesimo Cullen is a 34 y.o. male ordered vancomycin for SSTI; consult received from Dr. Villalobos to manage therapy.     Allergies:  No known allergies     Temp max:  Temp (24hrs), Av.8 °F (37.1 °C), Min:97 °F (36.1 °C), Max:100.9 °F (38.3 °C)      Recent Labs     25  1804   WBC 21.2*       Recent Labs     25  1804   BUN 13   CREATININE 0.6*         Intake/Output Summary (Last 24 hours) at 5/3/2025 0918  Last data filed at 5/3/2025 0652  Gross per 24 hour   Intake --   Output 950 ml   Net -950 ml       Culture Results:  pending    Ht Readings from Last 1 Encounters:   25 1.829 m (6')        Wt Readings from Last 1 Encounters:   25 67.8 kg (149 lb 7.6 oz)         Estimated Creatinine Clearance: 168 mL/min (A) (based on SCr of 0.6 mg/dL (L)).    Assessment/Plan:  Day # 2 of vancomycin.  Vancomycin 1000 mg IV every 8 hours.    Goal -600  Predicted       Thank you for the consult.   Kaitlin Young RPH, PharmD, 5/3/2025 9:19 AM    
PATIENT NAME:                     Onesimo Cullen  YOB: 1990   MEDICAL RECORD#         [id number]  SURGEON:                 Nikita Fisher MD      CHIEF COMPLAINT: left arm pain.    History:Mr. Onesimo Cullen is a 34-year-old gentleman who states that he has had increasing swelling and pain in his left forearm and elbow over the past 4 to 5 days.  He is an IV drug user and injects heroin.  The patient does endorse fever and chills.  He does have a history of sepsis, hepatitis C and MRSA bacteremia.  The patient last injected approximately 8 to 10 hours ago.  He is right-hand dominant.     PAST MEDICAL HISTORY:   Past Medical History:   Diagnosis Date    Hepatitis C     Illicit drug use     MRSA bacteremia 10/28/2020    MRSA infection 10/28/2020    abscess face        MEDICATIONS: The patient's medication reconciliation form was extensively reviewed and noted.     ALLERGIES: No Known Allergies     SOCIAL HISTORY:   Social History     Socioeconomic History    Marital status: Single     Spouse name: Not on file    Number of children: Not on file    Years of education: Not on file    Highest education level: Not on file   Occupational History    Not on file   Tobacco Use    Smoking status: Every Day     Current packs/day: 1.00     Types: Cigarettes    Smokeless tobacco: Current     Types: Chew   Vaping Use    Vaping status: Never Used   Substance and Sexual Activity    Alcohol use: Yes     Alcohol/week: 12.0 standard drinks of alcohol     Types: 12 Cans of beer per week     Comment: occasional use    Drug use: Yes     Types: IV, Opiates , Methamphetamines (Crystal Meth), Marijuana (Weed)     Comment: 0.5 gram a day     Sexual activity: Not on file   Other Topics Concern    Not on file   Social History Narrative    Not on file     Social Drivers of Health     Financial Resource Strain: Not on file   Food Insecurity: Unknown (1/22/2024)    Received from [facility], 
(5/3/2025)    Hunger Vital Sign     Worried About Running Out of Food in the Last Year: Patient declined     Ran Out of Food in the Last Year: Patient declined   Transportation Needs: Patient Declined (5/3/2025)    PRAPARE - Transportation     Lack of Transportation (Medical): Patient declined     Lack of Transportation (Non-Medical): Patient declined    Received from [facility]    Interpersonal Safety   Housing Stability: Patient Declined (5/3/2025)    Housing Stability Vital Sign     Unable to Pay for Housing in the Last Year: Patient declined     Number of Times Moved in the Last Year: 0     Homeless in the Last Year: Patient declined         Medications:   Medications:    melatonin  3 mg Oral Nightly    sodium chloride flush  5-40 mL IntraVENous 2 times per day    vancomycin (VANCOCIN) IV  1,000 mg IntraVENous Q12H    sennosides-docusate sodium  1 tablet Oral BID      Infusions:    sodium chloride 100 mL/hr at 05/02/25 2238    sodium chloride       PRN Meds: acetaminophen, 650 mg, Q4H PRN  methocarbamol, 750 mg, Q6H PRN  cloNIDine, 0.1 mg, Q6H PRN  loperamide, 2 mg, 4x Daily PRN  gabapentin, 300 mg, Q8H PRN  hydrOXYzine pamoate, 50 mg, Q8H PRN  iopamidol, 75 mL, ONCE PRN  sodium chloride flush, 5-40 mL, PRN  sodium chloride, , PRN  HYDROmorphone, 0.25 mg, Q3H PRN   Or  HYDROmorphone, 0.5 mg, Q3H PRN  oxyCODONE, 5 mg, Q4H PRN   Or  oxyCODONE, 10 mg, Q4H PRN  hydrOXYzine HCl, 10 mg, Q8H PRN  promethazine, 12.5 mg, Q6H PRN   Or  ondansetron, 4 mg, Q6H PRN        Labs and Imaging   XR ELBOW LEFT (2 VIEWS)  Result Date: 5/2/2025  EXAMINATION: XRAY VIEWS OF THE LEFT ELBOW 5/2/2025 6:19 pm COMPARISON: None. HISTORY: ORDERING SYSTEM PROVIDED HISTORY: infection TECHNOLOGIST PROVIDED HISTORY: Reason for exam:->infection Reason for Exam: edema to left forearm/ fluid filled/ FINDINGS: Three views of the left elbow demonstrate no evidence of acute 
Abscess of left arm L02.414    Infection requiring contact isolation precautions B99.9    History of methicillin resistant staphylococcus aureus (MRSA) Z86.14    Screening for HIV (human immunodeficiency virus) Z11.4    History of hepatitis C Z86.19    Opioid dependence with opioid-induced disorder (HCC) F11.29    Traumatic compartment syndrome of left upper extremity T79.A12A         Please note that this chart was generated using Dragon dictation software. Although every effort was made to ensure the accuracy of this automated transcription, some errors in transcription may have occurred inadvertently. If you may need any clarification, please do not hesitate to contact me through EPIC or at the phone number provided below with my electronic signature.  Any pictures or media included in this note were obtained after taking informed verbal consent from the patient and with their approval to include those in the patient's medical record.        Joel Waite MD, MPH, FACP, FID  5/3/2025, 3:47 PM  Central Office Phone: [phone]  Central Office Fax: [phone]    [facility] Infectious Disease   [address], [address] ([address] [place])  [place] [address]  [facility] days:  Tuesday & Thursday AM    [facility] Infectious Disease  [address] , [address] (Medical office Building)  [place], [place], [address]  [facility] days: Wednesday AM

## 2025-05-04 VITALS
BODY MASS INDEX: 20.31 KG/M2 | WEIGHT: 149.91 LBS | DIASTOLIC BLOOD PRESSURE: 81 MMHG | OXYGEN SATURATION: 98 % | HEIGHT: 72 IN | TEMPERATURE: 100.2 F | HEART RATE: 91 BPM | SYSTOLIC BLOOD PRESSURE: 120 MMHG | RESPIRATION RATE: 16 BRPM

## 2025-05-04 PROBLEM — Z71.6 TOBACCO ABUSE COUNSELING: Status: ACTIVE | Noted: 2025-05-04

## 2025-05-04 PROBLEM — Z71.51 DRUG ABUSE COUNSELING AND SURVEILLANCE OF DRUG ABUSER: Status: ACTIVE | Noted: 2025-05-04

## 2025-05-04 PROBLEM — A49.1 STREPTOCOCCAL INFECTION: Status: ACTIVE | Noted: 2025-05-04

## 2025-05-04 LAB
CREAT SERPL-MCNC: 0.7 MG/DL (ref 0.9–1.3)
GFR SERPLBLD CREATININE-BSD FMLA CKD-EPI: >90 ML/MIN/{1.73_M2}
HCV RNA SERPL NAA+PROBE-ACNC: NOT DETECTED IU/ML
HCV RNA SERPL NAA+PROBE-LOG IU: NOT DETECTED LOG IU/ML
HCV RNA SERPL QL NAA+PROBE: NOT DETECTED
HIV 1+2 AB+HIV1 P24 AG SERPL QL IA: NORMAL
HIV 2 AB SERPL QL IA: NORMAL
HIV1 AB SERPL QL IA: NORMAL
HIV1 P24 AG SERPL QL IA: NORMAL
VANCOMYCIN SERPL-MCNC: <4 UG/ML

## 2025-05-04 PROCEDURE — 6370000000 HC RX 637 (ALT 250 FOR IP): Performed by: INTERNAL MEDICINE

## 2025-05-04 PROCEDURE — 82565 ASSAY OF CREATININE: CPT

## 2025-05-04 PROCEDURE — 6370000000 HC RX 637 (ALT 250 FOR IP): Performed by: ORTHOPAEDIC SURGERY

## 2025-05-04 PROCEDURE — 94760 N-INVAS EAR/PLS OXIMETRY 1: CPT

## 2025-05-04 PROCEDURE — 80202 ASSAY OF VANCOMYCIN: CPT

## 2025-05-04 PROCEDURE — 99233 SBSQ HOSP IP/OBS HIGH 50: CPT | Performed by: INTERNAL MEDICINE

## 2025-05-04 PROCEDURE — 2580000003 HC RX 258: Performed by: ORTHOPAEDIC SURGERY

## 2025-05-04 PROCEDURE — 6360000002 HC RX W HCPCS: Performed by: INTERNAL MEDICINE

## 2025-05-04 PROCEDURE — 2580000003 HC RX 258: Performed by: STUDENT IN AN ORGANIZED HEALTH CARE EDUCATION/TRAINING PROGRAM

## 2025-05-04 PROCEDURE — 6360000002 HC RX W HCPCS: Performed by: STUDENT IN AN ORGANIZED HEALTH CARE EDUCATION/TRAINING PROGRAM

## 2025-05-04 PROCEDURE — 2580000003 HC RX 258: Performed by: INTERNAL MEDICINE

## 2025-05-04 PROCEDURE — 9990000010 HC NO CHARGE VISIT

## 2025-05-04 PROCEDURE — 36415 COLL VENOUS BLD VENIPUNCTURE: CPT

## 2025-05-04 RX ORDER — LACTOBACILLUS RHAMNOSUS GG 10B CELL
1 CAPSULE ORAL 2 TIMES DAILY WITH MEALS
Qty: 42 CAPSULE | Refills: 0 | Status: SHIPPED | OUTPATIENT
Start: 2025-05-05 | End: 2025-05-26

## 2025-05-04 RX ORDER — LACTOBACILLUS RHAMNOSUS GG 10B CELL
1 CAPSULE ORAL 2 TIMES DAILY WITH MEALS
Status: DISCONTINUED | OUTPATIENT
Start: 2025-05-04 | End: 2025-05-05 | Stop reason: HOSPADM

## 2025-05-04 RX ADMIN — VANCOMYCIN HYDROCHLORIDE 1000 MG: 1 INJECTION, POWDER, LYOPHILIZED, FOR SOLUTION INTRAVENOUS at 02:27

## 2025-05-04 RX ADMIN — MORPHINE SULFATE 4 MG: 4 INJECTION, SOLUTION INTRAMUSCULAR; INTRAVENOUS at 16:41

## 2025-05-04 RX ADMIN — SODIUM CHLORIDE: 9 INJECTION, SOLUTION INTRAVENOUS at 00:36

## 2025-05-04 RX ADMIN — Medication 1 CAPSULE: at 16:41

## 2025-05-04 RX ADMIN — MORPHINE SULFATE 4 MG: 4 INJECTION, SOLUTION INTRAMUSCULAR; INTRAVENOUS at 06:34

## 2025-05-04 RX ADMIN — MORPHINE SULFATE 4 MG: 4 INJECTION, SOLUTION INTRAMUSCULAR; INTRAVENOUS at 10:18

## 2025-05-04 RX ADMIN — MORPHINE SULFATE 4 MG: 4 INJECTION, SOLUTION INTRAMUSCULAR; INTRAVENOUS at 03:22

## 2025-05-04 RX ADMIN — MORPHINE SULFATE 4 MG: 4 INJECTION, SOLUTION INTRAMUSCULAR; INTRAVENOUS at 13:22

## 2025-05-04 RX ADMIN — VANCOMYCIN HYDROCHLORIDE 1000 MG: 1 INJECTION, POWDER, LYOPHILIZED, FOR SOLUTION INTRAVENOUS at 10:13

## 2025-05-04 RX ADMIN — CLONIDINE HYDROCHLORIDE 0.1 MG: 0.1 TABLET ORAL at 03:08

## 2025-05-04 RX ADMIN — METHOCARBAMOL TABLETS 750 MG: 750 TABLET, COATED ORAL at 03:08

## 2025-05-04 RX ADMIN — SODIUM CHLORIDE 1250 MG: 0.9 INJECTION, SOLUTION INTRAVENOUS at 18:16

## 2025-05-04 RX ADMIN — SODIUM CHLORIDE 3000 MG: 9 INJECTION, SOLUTION INTRAVENOUS at 14:40

## 2025-05-04 RX ADMIN — GABAPENTIN 300 MG: 300 CAPSULE ORAL at 03:08

## 2025-05-04 RX ADMIN — MORPHINE SULFATE 4 MG: 4 INJECTION, SOLUTION INTRAMUSCULAR; INTRAVENOUS at 00:22

## 2025-05-04 ASSESSMENT — PAIN SCALES - GENERAL
PAINLEVEL_OUTOF10: 10

## 2025-05-04 ASSESSMENT — PAIN DESCRIPTION - ONSET
ONSET: ON-GOING

## 2025-05-04 ASSESSMENT — PAIN DESCRIPTION - PAIN TYPE
TYPE: ACUTE PAIN;SURGICAL PAIN

## 2025-05-04 ASSESSMENT — PAIN DESCRIPTION - ORIENTATION
ORIENTATION: LEFT

## 2025-05-04 ASSESSMENT — PAIN DESCRIPTION - FREQUENCY
FREQUENCY: CONTINUOUS

## 2025-05-04 ASSESSMENT — PAIN DESCRIPTION - DESCRIPTORS
DESCRIPTORS: DISCOMFORT;BURNING
DESCRIPTORS: BURNING;DISCOMFORT
DESCRIPTORS: SHARP
DESCRIPTORS: BURNING;DISCOMFORT
DESCRIPTORS: DISCOMFORT;THROBBING
DESCRIPTORS: DISCOMFORT;SHARP

## 2025-05-04 ASSESSMENT — PAIN SCALES - WONG BAKER
WONGBAKER_NUMERICALRESPONSE: NO HURT
WONGBAKER_NUMERICALRESPONSE: NO HURT

## 2025-05-04 ASSESSMENT — PAIN DESCRIPTION - LOCATION
LOCATION: ARM

## 2025-05-04 NOTE — DISCHARGE INSTRUCTIONS
Change left forearm dressing daily  Sling left arm, can remove for bathing  Nonweightbearing left arm  Take all antibiotics as ordered.   Followup DR Fisher in office in one week  [facility], [address] [place] [address], [address]   [id number],   [phone]

## 2025-05-04 NOTE — PLAN OF CARE
Problem: Discharge Planning  Goal: Discharge to home or other facility with appropriate resources  5/3/2025 2031 by Melissa Carlson RN  Outcome: Progressing  Flowsheets (Taken 5/3/2025 1749 by Olesya Narayanan RN)  Discharge to home or other facility with appropriate resources:   Identify barriers to discharge with patient and caregiver   Identify discharge learning needs (meds, wound care, etc)   Arrange for needed discharge resources and transportation as appropriate   Refer to discharge planning if patient needs post-hospital services based on physician order or complex needs related to functional status, cognitive ability or social support system  5/3/2025 1749 by Olesya Narayanan RN  Flowsheets (Taken 5/3/2025 1749)  Discharge to home or other facility with appropriate resources:   Identify barriers to discharge with patient and caregiver   Identify discharge learning needs (meds, wound care, etc)   Arrange for needed discharge resources and transportation as appropriate   Refer to discharge planning if patient needs post-hospital services based on physician order or complex needs related to functional status, cognitive ability or social support system     Problem: Pain  Goal: Verbalizes/displays adequate comfort level or baseline comfort level  5/3/2025 2031 by Melissa Carlson RN  Outcome: Progressing  Flowsheets (Taken 5/3/2025 1749 by Olesya Narayanan RN)  Verbalizes/displays adequate comfort level or baseline comfort level:   Encourage patient to monitor pain and request assistance   Assess pain using appropriate pain scale   Consider cultural and social influences on pain and pain management   Notify Licensed Independent Practitioner if interventions unsuccessful or patient reports new pain   Administer analgesics based on type and severity of pain and evaluate response   Implement non-pharmacological measures as appropriate and evaluate response  5/3/2025 1749 by Olesya Narayanan RN  Flowsheets (Taken 5/3/2025

## 2025-05-04 NOTE — PLAN OF CARE
Problem: Discharge Planning  Goal: Discharge to home or other facility with appropriate resources     Problem: Pain  Goal: Verbalizes/displays adequate comfort level or baseline comfort level     Problem: Safety - Adult  Goal: Free from fall injury     Problem: ABCDS Injury Assessment  Goal: Absence of physical injury     Problem: Skin/Tissue Integrity  Goal: Skin integrity remains intact

## 2025-05-05 ENCOUNTER — RESULTS FOLLOW-UP (OUTPATIENT)
Dept: EMERGENCY DEPT | Age: 35
End: 2025-05-05

## 2025-05-05 LAB
BACTERIA BLD CULT: ABNORMAL
ORGANISM: ABNORMAL

## 2025-05-05 NOTE — RESULT ENCOUNTER NOTE
Culture result reviewed, no further treatment needed.  Chart reviewed.  Likely skin contaminant .  No further action indicated.

## 2025-05-05 NOTE — DISCHARGE SUMMARY
V2.0  Discharge Summary    Name:  Onesimo Cullen /Age/Sex: 1990 (34 y.o. male)   Admit Date: 2025  Discharge Date: 25    MRN & CSN:  [id number] & [id number] Encounter Date and Time 25 9:24 PM EDT    Attending:  James Villalobos MD Discharging Provider: James Villalobos MD       Hospital Course:     Brief HPI: Onesimo Cullen is a 34 y.o. male who presented with sepsis due to cellulitis and abscess of the left arm    Brief Problem Based Course:   Severe sepsis due to cellulitis and abscess of left arm upper extremity related to IV drug use: Patient presented to the hospital due to swelling and pain and redness of his left arm.  Patient was found to be in severe sepsis with leukocytosis, fever, tachycardia, tachypnea, and elevated initial lactate.  Ortho was consulted and patient was taken semiemergently to the OR for debridement.  Large amount of necrotic tissue and pus was irrigated and debrided.  No further infection was noted.  Patient was treated with IV antibiotics and infectious disease was consulted.  Operative culture grew Streptococcus Intermedius.  Patient began refusing labs, reportedly due to frustration that he needed multiple sets of labs on his first 2 days of admission and stated that he wished to leave.  Indicated to the patient that the multiple sets of labs were needed because of the severity of his infection and that we still needed daily labs to be able to give him medication safely, including his pain medication.  Further reminded patient of previous discussions of plan for at least an additional day of IV antibiotics, again due to severity of his infection.  Patient accused staff of intensely withholding pain medicine due to his lack of cooperation.  Staff reiterated that it was a safety issue and patient agreed to get labs drawn.  Later in the day the patient insisted that he was leaving.  Patient signed out AGAINST MEDICAL ADVICE.  Patient was provided with a 3-week

## 2025-05-05 NOTE — PROGRESS NOTES
Infectious Diseases   Progress Note      Admission Date: 5/2/2025  Hospital Day: Hospital Day: 3   Attending: James Villalobos MD  Date of service: 5/4/2025     Chief complaint/ Reason for consult:     Sepsis on admission with tachycardia, leukocytosis, elevated lactic acid level of 2.0  Positive blood culture for gram-positive rods, may be contaminant from the skin  History of MRSA bacteremia in October 2020  Complicated left arm abscess  Status post left arm surgical I&D on May 2, 2025  Hyponatremia with serum sodium of 129 on May 2    Microbiology:      I have reviewed allavailable micro lab data and cultures    Results       Procedure Component Value Units Date/Time    MRSA DNA Probe, Nasal [[id number]] Collected: 05/03/25 0700    Order Status: Completed Specimen: Nares Updated: 05/03/25 2152     MRSA SCREEN RT-PCR --     Negative  MRSA DNA not detected.  Normal Range: Not detected      Narrative:      ORDER#: [id number]                          ORDERED BY: PAUL HOWARD  SOURCE: Nares                              COLLECTED:  05/03/25 07:00  ANTIBIOTICS AT RINKU.:                      RECEIVED :  05/03/25 07:13    Culture, Surgical [[id number]]  (Abnormal) Collected: 05/02/25 2108    Order Status: Completed Specimen: Specimen from Arm Updated: 05/04/25 0934     Gram Stain Result 2+ Epithelial Cells  4+ WBC's (Polymorphonuclear)  4+ Gram positive cocci       Anaerobic Culture --     Anaerobic culture further report to follow  No anaerobes isolated so far, Further report to follow       Organism Streptococcus intermedius     Culture Surgical --     Light growth  Sensitivity to follow      Narrative:      ORDER#: [id number]                          ORDERED BY: GREGORIO JIMENEZ  SOURCE: Arm                                COLLECTED:  05/02/25 21:08  ANTIBIOTICS AT RINKU.:                      RECEIVED :  05/02/25 22:16    Culture, Blood 2 [[id number]] Collected: 05/02/25 2023    Order Status: Completed Specimen: Blood 
4 Eyes Skin Assessment     NAME:  Onesimo Cullen  YOB: 1990  MEDICAL RECORD NUMBER:  [id number]    The patient is being assessed for  Admission    I agree that at least one RN has performed a thorough Head to Toe Skin Assessment on the patient. ALL assessment sites listed below have been assessed.      Areas assessed by both nurses:    Head, Face, Ears, Shoulders, Back, Chest, Arms, Elbows, Hands, Sacrum. Buttock, Coccyx, Ischium, Legs. Feet and Heels, and Under Medical Devices         Does the Patient have a Wound? Yes wound(s) were present on assessment. LDA wound assessment was Initiated and completed by RN       Keenan Prevention initiated by RN: Yes  Wound Care Orders initiated by RN: No    Pressure Injury (Stage 3,4, Unstageable, DTI, NWPT, and Complex wounds) if present, place Wound referral order by RN under : No    New Ostomies, if present place, Ostomy referral order under : No     Nurse 1 eSignature: Electronically signed by Pineda Garces RN on 5/2/25 at 11:36 PM EDT    **SHARE this note so that the co-signing nurse can place an eSignature**    Nurse 2 eSignature: Electronically signed by Kaitlin Vega RN on 5/3/25 at 2:37 AM EDT   
ACE wrap loosened per pt request and report that hand swelling has increased since application.   
Clinical Pharmacy Note  Vancomycin Consult    Onesimo Cullen is a 34 y.o. male ordered vancomycin for SSTI; consult received from Dr. Villalobos to manage therapy.     Allergies:  No known allergies     Temp max:  Temp (24hrs), Av.2 °F (37.9 °C), Min:100.2 °F (37.9 °C), Max:100.2 °F (37.9 °C)      Recent Labs     25  18025  1003   WBC 21.2* 15.1*       Recent Labs     25  1804 25  1803 25  1516   BUN 13 6*  --    CREATININE 0.6* 0.6* 0.7*         Intake/Output Summary (Last 24 hours) at 2025 1702  Last data filed at 2025 1322  Gross per 24 hour   Intake 1130 ml   Output 3750 ml   Net -2620 ml       Ht Readings from Last 1 Encounters:   25 1.829 m (6')        Wt Readings from Last 1 Encounters:   25 68 kg (149 lb 14.6 oz)         Estimated Creatinine Clearance: 144 mL/min (A) (based on SCr of 0.7 mg/dL (L)).    Assessment:  Day # 3 of vancomycin.  Current regimen: 1000 mg every 8 hours  Vancomycin level: <4 mg/L  Predicted AUC: 488    Plan:  Change regimen to 1250 mg every 8 hours.      Thank you for the consult.   Corina Kaiser [facility],2025,5:10 PM      
Dilaudid 0.5 mg given IVP for left arm pain . Left arm /ace wrap drsg dry and intact.  Patient alert and oriented x 4. Respirations regular and unlabored on room air.  Patient refusing labs at times.   Fall/safety precautions in place. Call light in reach.  Electronically signed by Olesya Narayanan RN on 5/3/2025 at 5:55 PM    
Medication Reconciliation    List of medications patient is currently taking is complete.     Source of information: 1. Conversation with patient at bedside                                      2. EPIC records         Notes regarding home medications:   1. Patient has been out of Suboxone for about a month.         Kaitlin Yonug [facility], PharmD, 5/3/2025 12:43 PM        
Morphine 4mg given IVP for left arm discomfort. Patient cooperative at present time. Fall/safety precautions in place. Call light in place. Electronically signed by Olesya Narayanan RN on 5/4/2025 at 5:11 PM    
Morphine 4mg given IVP for left arm pain. New left arm drsg applied. Incision covered with xeroform, guaze, kerlix and ace wrap. Patient tolerated drsg change well. Fall/safety precautions in place. Call light in reach. Electronically signed by Olesya Narayanan RN on 5/3/2025 at 8:08 PM    
Occupational Therapy  Facility/Department: [address] ORTHOPEDICS  Occupational Therapy    Name: Onesimo Cullen  : 1990  MRN: [id number]  Date of Service: 5/3/2025    OT orders received and chart reviewed. Attempted to see pt for OT evaluation however pt consistently declining participation at this time. Pt stating his RUE is in \"a lot\" of pain and recently received pain medication which has made him extremely fatigued. Will follow up as schedule allows, no charge.           AUBREY GOMEZ, OTR/L     
Patient called this RN requesting pain medication. This RN entered room with oral oxy per protocol. Patient refused oxy and said he will only take morphine because oxy does not help his pain.     Oxy wasted with RN Molly.    Morphine administered.   
Patient signed out AMA.  aware. Patient teaching done about left arm wound care and follow -up care. Electronically signed by Olesya Narayanan RN on 5/4/2025 at 8:14 PM    
Patient unhappy with this RN at this time. Patient states that this RN is trying to manipulate him by refusing to administer most recent dose of morphine prior to checking vitals. Patient attempt to refuse vitals, then let this RN check BP after this RN educated patient on why this is important. Patient shared opinions that he should not be woken up while sleeping for something that doesn't matter. Patient educated on the various reasons why vital signs are very important and indicate if certain medications are safe to administer or not.   
Physical Therapy    PT referral received and chart reviewed.  33 y/o male with H/O IV Heroin Abuse; S/P ID L UE Abscess.  Upon arrival at bedside today, pt lying in bed and reports no concerns with mobility. Encourage light AROM L UE (per Ortho note).  Pt declines need for PT Services.  Spoke with nursing, reports oob with assist only due to frequent pain meds.  PT to sign off.  Melanie Max, PT  
Pt arrived from PACU at this time s/p I/D left arm abscess. Left arm wrapped with dressing and ace wrap as well as in a sling. Pt moaning out in pain 10/10 left arm. Tachycardic, otherwise , vitals stable. Oriented to room , call light , and plan of care. Bed in low locked position. Call light placed in reach on right side.   
Pt febrile this am 100.9. Message sent to Hospitalist for Tylenol order needed.   
Pt running low grade temp of 99.7 this am . Has been medicated with Oxycodone and Dilaudid for pain to left arm. Dressing to left arm clean dry and intact with sling in place. Fingers swollen. Extremity warm to touch and able to move fingers. Cap refill is brisk.   
Pt was given 10 mg Oxycodone after arrival to unit but continued to moan out and thrash around in pain.. Dilaudid 0.5 mg IVP given   
Status Note  5/3/2025  Onesimo AMARAL Subhash  [id number]  [id number]    Per conversation with OT, patient has been refusing most care, and declined OT earlier. Per discussion with RN, patient wanting to rest.   Will defer therapy today.     Electronically signed by VINH MERRILL, PT [id number] (#[phone])  on 5/3/2025 at 2:58 PM            
The patient is sitting up in bed postoperative day #1 status post open irrigation and debridement of deep left arm abscess.  The patient has a history of IV heroin abuse.  He reports significant pain.  The dressing is intact.  He is neurovascularly intact.  Surgical culture grew gram-positive rods.  The patient will continue on IV antibiotics per infectious disease.  The dressing may be changed today.  He may begin light range of motion to the left arm and elbow.  The patient may possibly be ready for discharge home on Monday with oral antibiotics.  We will defer this to infectious disease.  
To pacu. Arouses. Easily. VSS on RA. Sling in place, ice to patient. Denies pain/nausea  
Lines and tubes: None The lungs are clear.  Heart size is normal.     No acute cardiopulmonary process.       Electronically signed by James Villalobos MD on 5/4/2025 at 8:46 AM

## 2025-05-06 LAB
BACTERIA BLD CULT ORG #2: NORMAL
HCV RNA SERPL NAA+PROBE-ACNC: NOT DETECTED IU/ML
HCV RNA SERPL NAA+PROBE-LOG IU: NOT DETECTED LOG IU/ML
HCV RNA SERPL QL NAA+PROBE: NOT DETECTED

## 2025-05-07 LAB
BACTERIA SPEC AEROBE CULT: ABNORMAL
BACTERIA SPEC ANAEROBE CULT: ABNORMAL
GRAM STN SPEC: ABNORMAL
ORGANISM: ABNORMAL

## 2025-05-12 ENCOUNTER — TELEPHONE (OUTPATIENT)
Dept: ORTHOPEDIC SURGERY | Age: 35
End: 2025-05-12

## 2025-05-12 NOTE — TELEPHONE ENCOUNTER
----- Message from Clara PAYNA sent at 5/12/2025  5:02 PM EDT -----  Regarding: Specialist Appointment Request - Established  Specialist Appointment Request - Established    What Specialty? Select Speciality: Orthopedics     Type of Appointment: Appointment Type: Post-Op    Reason for appointment?  POST OP APPT     Scheduling Preference per Patient: [facility] [place]       Additional Information: Patient is calling to get and appointment and he also has some questions for the clinical staff he had surgery on his Lt Hand on 05/05/25. He just need a call back.   --------------------------------------------------------------------------------------------------------------------------    Relationship to Patient: Self  Call Back Information: OK to leave message on [employer]  Preferred Call Back Number:  [phone]

## 2025-05-13 ENCOUNTER — TELEPHONE (OUTPATIENT)
Dept: ORTHOPEDIC SURGERY | Age: 35
End: 2025-05-13

## 2025-05-13 NOTE — TELEPHONE ENCOUNTER
LVM for pt to call back.     Catherine Khan, APRN - CNP    5/4/25  1:22 PM  Note  Change left forearm dressing daily.  Sling left arm, can remove for bathing.  Nonweightbearing left arm.  Take all antibiotics as ordered.   Follow up with Dr Fisher in office in one week.

## 2025-05-13 NOTE — TELEPHONE ENCOUNTER
----- Message from Clara PAYAN sent at 5/12/2025  5:02 PM EDT -----  Regarding: Specialist Appointment Request - Established  Specialist Appointment Request - Established    What Specialty? Select Speciality: Orthopedics     Type of Appointment: Appointment Type: Post-Op    Reason for appointment?  POST OP APPT     Scheduling Preference per Patient: [facility] [place]       Additional Information: Patient is calling to get and appointment and he also has some questions for the clinical staff he had surgery on his Lt Hand on 05/05/25. He just need a call back.   --------------------------------------------------------------------------------------------------------------------------    Relationship to Patient: Self  Call Back Information: OK to leave message on [employer]  Preferred Call Back Number:  [phone]

## 2025-06-02 PROBLEM — Z11.4 SCREENING FOR HIV (HUMAN IMMUNODEFICIENCY VIRUS): Status: RESOLVED | Noted: 2025-05-03 | Resolved: 2025-06-02

## (undated) DEVICE — SUTURE VICRYL + SZ 2-0 L18IN ABSRB VLT L26MM SH 1/2 CIR VCP775D

## (undated) DEVICE — BANDAGE COMPR W4INXL10YD WHITE/BEIGE E MTRX HK LOOP CLSR

## (undated) DEVICE — BANDAGE,GAUZE,BULKEE II,4.5"X4.1YD,STRL: Brand: MEDLINE

## (undated) DEVICE — BASIC SINGLE BASIN 1-LF: Brand: MEDLINE INDUSTRIES, INC.

## (undated) DEVICE — ZIMMER® STERILE DISPOSABLE TOURNIQUET CUFF WITH PLC, DUAL PORT, SINGLE BLADDER, 18 IN. (46 CM)

## (undated) DEVICE — GLOVE ORTHO 8   MSG9480

## (undated) DEVICE — DRESSING,GAUZE,XEROFORM,CURAD,5"X9",ST: Brand: CURAD

## (undated) DEVICE — SUTURE PROL SZ 3-0 L18IN NONABSORBABLE BLU L30MM PS-1 3/8 8663G

## (undated) DEVICE — ELECTRODE PT RET AD L9FT HI MOIST COND ADH HYDRGEL CORDED

## (undated) DEVICE — Device